# Patient Record
Sex: FEMALE | Race: WHITE | NOT HISPANIC OR LATINO | Employment: FULL TIME | URBAN - METROPOLITAN AREA
[De-identification: names, ages, dates, MRNs, and addresses within clinical notes are randomized per-mention and may not be internally consistent; named-entity substitution may affect disease eponyms.]

---

## 2017-11-29 ENCOUNTER — ALLSCRIPTS OFFICE VISIT (OUTPATIENT)
Dept: OTHER | Facility: OTHER | Age: 47
End: 2017-11-29

## 2017-11-29 DIAGNOSIS — N92.6 IRREGULAR MENSTRUATION: ICD-10-CM

## 2017-11-30 NOTE — PROGRESS NOTES
Assessment    1  Irregular bleeding (626 4) (N92 6)    Plan  Irregular bleeding    · * US PELVIS COMPLETE (TRANSABDOMINAL AND TRANSVAGINAL); Status:Active -Retrospective By Protocol Authorization; Requested for:2017;    Perform:[facility]; [phone]; Last Updated Castro Winters; 2017 11:02:07 AM;Ordered;bleeding; Ordered By:Areli Nina;    Discussion/Summary  Discussion Summary:   1  Recommend pelvic ultrasoundRecommend CBC with diff, ferritin and ironReturn to office after above is done for an endometrial biopsy  Chief Complaint  Chief Complaint Free Text Note Form: irregular bleeding      History of Present Illness  HPI: This is a 73-year-old female  ( x2, age 26 and 16) who is a long-term patient of Dr Roge Contreras, presents today complaining of irregular menses  She was seen last year and was informed that she was monica menopausal  Her cycles throughout the year have been irregular anywhere from every 1-2 months lasting 5-6 days up until  where she has now been bleeding continuously  She states last week she was passing large clots  She denies any hot flashes or night sweats  She has been stressed out  She is in the process of a divorce  She was evaluated by her family physician and had labs done including cholesterol and thyroid which was normal  She does have a copy of these results today  There is not a CBC included  She also states she has had difficulty losing weight despite aggressive dieting  She was diagnosed with anxiety and depression  Her family physician recommended medication  She does express concerns regarding any type of medications due to significant allergies  was on birth control pills approximately 14 years ago for contraception  Patient does not smoke  Review of Systems  Focused-Female:  Cardiovascular: no complaints of slow or fast heart rate, no chest pain, no palpitations, no leg claudication or lower extremity edema    Respiratory: no complaints of shortness of breath, no wheezing, no dyspnea on exertion, no orthopnea or PND  Gastrointestinal: as noted in HPI  Genitourinary: as noted in HPI  ROS Reviewed:   ROS reviewed  Active Problems  1  Abdominal bloating (787 3) (R14 0)   2  Allergic rhinitis (477 9) (J30 9)   3  Anxiety (300 00) (F41 9)   4  Dyspepsia (536 8) (R10 13)   5  Eczema (692 9) (L30 9)   6  Eczema of scalp (692 9) (L30 9)   7  Encounter for screening mammogram for breast cancer (V76 12) (Z12 31)   8  GERD (gastroesophageal reflux disease) (530 81) (K21 9)   9  Hematuria, microscopic (599 72) (R31 29)   10  Hyperglycemia (790 29) (R73 9)   11  Hyperlipidemia (272 4) (E78 5)   12  Irregular bleeding (626 4) (N92 6)   13  Irregular menses (626 4) (N92 6)   14  Mood swing (296 99) (F39)   15  Non-toxic uninodular goiter (241 0) (E04 1)   16  Obese   17  Perimenopausal (627 2) (N95 1)   18  Urinary tract infection (599 0) (N39 0)    Past Medical History  1  History of Fatigue (780 79) (R53 83)   2  History of Nausea (787 02) (R11 0)   3  History of Other abnormal finding of urine (791 9) (R82 99)  Active Problems And Past Medical History Reviewed: The active problems and past medical history were reviewed and updated today  Surgical History  1  History of Surgery Excision Of Lesion Of Gum  Surgical History Reviewed: The surgical history was reviewed and updated today  Family History  Mother    1  Denied: Family history of colitis   2  Denied: Family history of colonic polyps   3  Denied: Family history of Crohn's disease   4  Denied: Family history of liver disease   5  Family history of Thyroid Disorder (V18 19)  Father    6  Denied: Family history of colitis   7  Denied: Family history of colonic polyps   8  Denied: Family history of Crohn's disease   9  Denied: Family history of liver disease  Maternal Grandmother    8  Family history of Alcohol Abuse  Paternal Grandmother    6   Family history of Diabetes Mellitus (V18 0)  Maternal Grandfather    12  Family history of Arthritis (V17 7)   13  Family history of Hypertension (V17 49)  Family History    15  Family history of Coronary artery sclerosis   15  Family history of Diverticulitis   16  Family history of Diverticulosis   17  Family history of cerebrovascular accident (V17 1) (Z82 3)   25  Family history of gallbladder disease (V18 59) (Z83 79)   19  Family history of irritable bowel syndrome (V18 59) (Z83 79)   20  Family history of malignant neoplasm of prostate (V16 42) (Z80 42)   21  Family history of myocardial infarction (V17 3) (Z82 49)   22  Family history of pancreatic cancer (V16 0) (Z80 0)   23  Family history of pancreatitis (V18 59) (Z83 79)   24  Family history of Mitral valve prolapse   25  Family history of Pernicious anemia  Family History Reviewed: The family history was reviewed and updated today  Social History     · Former smoker (R54 93) (T89 998)   · Marital History - Currently    · Never Drank Alcohol  Social History Reviewed: The social history was reviewed and updated today  Current Meds   1  ALPRAZolam 0 25 MG Oral Tablet Disintegrating; PLACE 1 TABLET ON TONGUE AND ALLOW TO DISSOLVE TWICE DAILY AS NEEDED; Therapy: 50Adr5858 to (Evaluate:58Pxy3461); Last Rx:12Sep2016 Ordered   2  Azelastine HCl - 0 1 % Nasal Solution; USE 2 SPRAYS IN EACH       NOSTRIL TWICE DAILY; Therapy: 79Yab0246 to (Last Rx:22Apr2016)  Requested for: 95Olz0934 Ordered   3  Daily Multiple Vitamins Oral Tablet; Take 1 tablet daily Recorded   4  Mometasone Furoate 0 1 % External Ointment; APPLY SPARINGLY TO AFFECTED AREA(S) ONCE DAILY; Therapy: 95KNI7937 to (Last Rx:02Jun2016)  Requested for: 02Jun2016 Ordered   5  Mometasone Furoate 0 1 % External Solution; Apply to scalp at bedtime; Therapy: 78CJX2863 to (Last Rx:02Jun2016)  Requested for: 02Jun2016 Ordered   6  Omeprazole 20 MG Oral Capsule Delayed Release; TAKE 1 CAPSULE DAILY;  Therapy: 50UAK5523 to (Shabbir Stephens)  Requested for: 26WXK8497; Last AM:05XZC0030 Ordered   7  Omeprazole 40 MG Oral Capsule Delayed Release; TAKE 1 CAPSULE DAILY; Therapy: 35ITG2376 to (Janny Longoria)  Requested for: 59RUF7551; Last LY:18NTG5711 Ordered   8  Ondansetron 8 MG Oral Tablet Disintegrating; TAKE 1 TABLET EVERY 8 HOURS AS NEEDED FOR NAUSEA AND VOMITING; Therapy: 82Hgt0576 to (Last Rx:85Afu3436)  Requested for: 44Rxn2285 Ordered   9  Sulfamethoxazole-Trimethoprim 800-160 MG Oral Tablet; TAKE 1 TABLET EVERY 12 HOURS WITH FOOD; Therapy: 16SRE2482 to (Evaluate:58Zvq7072)  Requested for: 70Etc4284; Last Rx:43Kgk7965 Ordered    Allergies  1  Cephalosporins   2  Citalopram Hydrobromide SOLN   3  Ibuprofen TABS   4  Penicillins   5  Pseudoephedrine HCl TABS   6  Quinolones   7  Wellbutrin TABS   8  Zithromax TABS    Vitals  Vital Signs    Recorded: 32SYE4188 71:53OZ   Systolic 073   Diastolic 68   Height 5 ft 6 in   Weight 212 lb    BMI Calculated 34 22   BSA Calculated 2 05   LMP 19Oct2017       Physical Exam   Constitutional  General appearance: No acute distress, well appearing and well nourished  Pulmonary  Auscultation of lungs: Clear to auscultation  Cardiovascular  Auscultation of heart: Normal rate and rhythm, normal S1 and S2, no murmurs  Genitourinary  External genitalia: Normal and no lesions appreciated  Vagina: Normal, no lesions or dryness appreciated  Urethral meatus: Normal    Cervix: Normal, no palpable masses  -- Patient is menstruating today  Uterus: Normal, non-tender, not enlarged, and no palpable masses  Adnexa/parametria: Normal, non-tender and no fullness or masses appreciated  Abdomen  Abdomen: Normal, non-tender, and no organomegaly noted         Signatures   Electronically signed by : Mireya Maki DO; Nov 29 2017 11:05AM EST                       (Author)

## 2017-12-05 ENCOUNTER — HOSPITAL ENCOUNTER (OUTPATIENT)
Dept: ULTRASOUND IMAGING | Facility: HOSPITAL | Age: 47
Discharge: HOME | End: 2017-12-05
Attending: OBSTETRICS & GYNECOLOGY
Payer: COMMERCIAL

## 2017-12-05 ENCOUNTER — APPOINTMENT (OUTPATIENT)
Dept: LAB | Facility: HOSPITAL | Age: 47
End: 2017-12-05
Attending: OBSTETRICS & GYNECOLOGY
Payer: COMMERCIAL

## 2017-12-05 DIAGNOSIS — N92.6 IRREGULAR MENSTRUATION: ICD-10-CM

## 2017-12-05 LAB
BASOPHILS # BLD AUTO: 0.01 THOUSANDS/ΜL (ref 0–0.1)
BASOPHILS NFR BLD AUTO: 0 % (ref 0–1)
EOSINOPHIL # BLD AUTO: 0.38 THOUSAND/ΜL (ref 0–0.61)
EOSINOPHIL NFR BLD AUTO: 5 % (ref 0–6)
ERYTHROCYTE [DISTWIDTH] IN BLOOD BY AUTOMATED COUNT: 12.6 % (ref 11.6–15.1)
FERRITIN SERPL-MCNC: 10 NG/ML (ref 8–388)
HCT VFR BLD AUTO: 32.9 % (ref 34.8–46.1)
HGB BLD-MCNC: 11 G/DL (ref 11.5–15.4)
IRON SERPL-MCNC: 37 UG/DL (ref 50–170)
LYMPHOCYTES # BLD AUTO: 1.98 THOUSANDS/ΜL (ref 0.6–4.47)
LYMPHOCYTES NFR BLD AUTO: 28 % (ref 14–44)
MCH RBC QN AUTO: 29.1 PG (ref 26.8–34.3)
MCHC RBC AUTO-ENTMCNC: 33.4 G/DL (ref 31.4–37.4)
MCV RBC AUTO: 87 FL (ref 82–98)
MONOCYTES # BLD AUTO: 0.51 THOUSAND/ΜL (ref 0.17–1.22)
MONOCYTES NFR BLD AUTO: 7 % (ref 4–12)
NEUTROPHILS # BLD AUTO: 4.32 THOUSANDS/ΜL (ref 1.85–7.62)
NEUTS SEG NFR BLD AUTO: 60 % (ref 43–75)
NRBC BLD AUTO-RTO: 0 /100 WBCS
PLATELET # BLD AUTO: 315 THOUSANDS/UL (ref 149–390)
PMV BLD AUTO: 10.1 FL (ref 8.9–12.7)
RBC # BLD AUTO: 3.78 MILLION/UL (ref 3.81–5.12)
WBC # BLD AUTO: 7.2 THOUSAND/UL (ref 4.31–10.16)

## 2017-12-05 PROCEDURE — 36415 COLL VENOUS BLD VENIPUNCTURE: CPT

## 2017-12-05 PROCEDURE — 85025 COMPLETE CBC W/AUTO DIFF WBC: CPT

## 2017-12-05 PROCEDURE — 76830 TRANSVAGINAL US NON-OB: CPT

## 2017-12-05 PROCEDURE — 83540 ASSAY OF IRON: CPT

## 2017-12-05 PROCEDURE — 82728 ASSAY OF FERRITIN: CPT

## 2017-12-05 PROCEDURE — 76856 US EXAM PELVIC COMPLETE: CPT

## 2017-12-12 ENCOUNTER — GENERIC CONVERSION - ENCOUNTER (OUTPATIENT)
Dept: OTHER | Facility: OTHER | Age: 47
End: 2017-12-12

## 2017-12-12 PROCEDURE — 88305 TISSUE EXAM BY PATHOLOGIST: CPT | Performed by: OBSTETRICS & GYNECOLOGY

## 2017-12-13 ENCOUNTER — LAB REQUISITION (OUTPATIENT)
Dept: LAB | Facility: HOSPITAL | Age: 47
End: 2017-12-13
Payer: COMMERCIAL

## 2017-12-13 DIAGNOSIS — N92.6 IRREGULAR MENSTRUATION: ICD-10-CM

## 2017-12-19 ENCOUNTER — GENERIC CONVERSION - ENCOUNTER (OUTPATIENT)
Dept: OTHER | Facility: OTHER | Age: 47
End: 2017-12-19

## 2018-01-10 NOTE — RESULT NOTES
Verified Results  (1) CELIAC DISEASE AB PROFILE 06Jun2016 08:39AM Gifty Hartmann Order Number: [id number]     Test Name Result Flag Reference   tTG IGG <2 U/mL  0 - 5   Negative        0 - 5                              Weak Positive   6 - 9                              Positive           >9   tTG IGA <2 U/mL  0 - 3   Negative        0 -  3                              Weak Positive   4 - 10                              Positive           >10 Tissue Transglutaminase (tTG) has been identified as the endomysial antigen  Studies have demonstr- ated that endomysial IgA antibodies have over 99% specificity for gluten sensitive enteropathy     GLIADA 3 units  0 - 19   Negative                   0 - 19                   Weak Positive             20 - 30                   Moderate to Strong Positive   >30   GLIADG 1 units  0 - 19   Negative                   0 - 19                   Weak Positive             20 - 30                   Moderate to Strong Positive   >30   ENDOMYSIAL AB IGA Negative  Negative   Performed at:  [facility] [place] [address]  [id number]  : Familia Esparza MD, Phone:  [phone]    mg/dL  56 - 311

## 2018-01-11 NOTE — RESULT NOTES
Verified Results  (1) COMPREHENSIVE METABOLIC PANEL 49BDD0184 65:49UT Adin Hartmann     Test Name Result Flag Reference   GLUCOSE,RANDM 94 mg/dL     If the patient is fasting, the ADA then defines impaired fasting glucose as > 100 mg/dL and diabetes as > or equal to 123 mg/dL  SODIUM 137 mmol/L  136-145   POTASSIUM 4 5 mmol/L  3 5-5 3   CHLORIDE 106 mmol/L  100-108   CARBON DIOXIDE 28 mmol/L  21-32   ANION GAP (CALC) 3 mmol/L L 4-13   BLOOD UREA NITROGEN 18 mg/dL  5-25   CREATININE 0 77 mg/dL  0 60-1 30   Standardized to IDMS reference method   CALCIUM 9 0 mg/dL  8 3-10 1   BILI, TOTAL 0 34 mg/dL  0 20-1 00   ALK PHOSPHATAS 91 U/L     ALT (SGPT) 19 U/L  12-78   AST(SGOT) 11 U/L  5-45   ALBUMIN 3 9 g/dL  3 5-5 0   TOTAL PROTEIN 7 1 g/dL  6 4-8 2   eGFR Non-African American      >60 0 ml/min/1 [facility] [employer] Disease Education Program recommendations are as follows:  GFR calculation is accurate only with a steady state creatinine  Chronic Kidney disease less than 60 ml/min/1 73 sq  meters  Kidney failure less than 15 ml/min/1 73 sq  meters  (1) HEMOGLOBIN A1C 04KIP0989 08:39AM Adin Hartmann     Test Name Result Flag Reference   HEMOGLOBIN A1C 5 2 %  4 2-6 3   EST  AVG  GLUCOSE 103 mg/dl       (1) LIPID PANEL, FASTING 47WSI6856 08:39AM Emilio Hartmann     Test Name Result Flag Reference   CHOLESTEROL 210 mg/dL H    HDL,DIRECT 46 mg/dL  40-60   Specimen collection should occur prior to Metamizole administration due to the potential for falsely depressed results     LDL CHOLESTEROL CALCULATED 139 mg/dL H 0-100   Triglyceride:         Normal              <150 mg/dl       Borderline High    150-199 mg/dl       High               200-499 mg/dl       Very High          >499 mg/dl  Cholesterol:         Desirable        <200 mg/dl      Borderline High  200-239 mg/dl      High             >239 mg/dl  HDL Cholesterol:        High    >59 mg/dL      Low     <41 mg/dL  LDL

## 2018-01-11 NOTE — RESULT NOTES
Verified Results  * NM HEPATOBILIARY 96DRG2378 12:53PM Emilio Hartmann     Test Name Result Flag Reference   NM HEPATOBILIARY (Report)     HEPATOBILIARY SCAN WITH CHOLECYSTOKININ ADMINISTRATION      INDICATION: Right upper quadrant pain, nausea for one year     COMPARISON: None available     TECHNIQUE:  Following the intravenous administration of 5 3 mCi Tc-99m labeled mebrofenin, dynamic abdominal images were obtained over a 60 minute time period  Images were performed in AP projection  FINDINGS:      There is prompt, uniform accumulation with normal clearance of the radiopharmaceutical by the liver  There is normal filling of the intrahepatic ducts, common bile duct and gallbladder with normal excretion of the radiopharmaceutical into the duodenum  In order to evaluate the contractile response of the gallbladder to cholecystokinin stimulation, 1 7 mcg sincalide (0 02 mcg/kg) was administered by slow intravenous infusion over 60 minutes  These images demonstrate normal contraction of the    gallbladder  The calculated gallbladder ejection fraction is 65 % (N = >35%)  The patient experienced no symptoms after CCK administration  IMPRESSION:     1  Normal hepatobiliary study   2   Normal contractile response of the gallbladder to cholecystokinin infusion  (65%)      Signed by:   Larry Up MD   1/12/16

## 2018-01-14 VITALS
SYSTOLIC BLOOD PRESSURE: 110 MMHG | HEIGHT: 66 IN | WEIGHT: 212 LBS | DIASTOLIC BLOOD PRESSURE: 68 MMHG | BODY MASS INDEX: 34.07 KG/M2

## 2018-01-15 NOTE — RESULT NOTES
Verified Results  * US ABDOMEN COMPLETE 12Jan2016 10:20AM Emilio Hartmann     Test Name Result Flag Reference   US ABDOMEN COMPLETE (Report)     ABDOMEN ULTRASOUND, COMPLETE     INDICATION: Epigastric pain  Nausea  COMPARISON: 4/21/2015     TECHNIQUE:  Real-time ultrasound of the abdomen was performed with a curvilinear transducer with both volumetric sweeps and still imaging techniques  FINDINGS:     PANCREAS: Visualized portions of the pancreas are within normal limits  AORTA AND IVC: Visualized portions are normal for patient age  LIVER:   Size: Within normal range  The liver measures 16 6 cm in the midclavicular line  Contour: Surface contour is smooth  Parenchyma: Echogenicity and echotexture are within normal limits  No evidence of suspicious mass  The main portal vein is patent and hepatopetal       BILIARY:   The gallbladder is normal in caliber  No wall thickening or pericholecystic fluid  No stones or sludge identified  Sonographic Roland Saver sign is negative  No intrahepatic biliary dilatation  CBD measures 3 mm  No choledocholithiasis  KIDNEY:    Right kidney measures 11 7 cm  Within normal limits  Left kidney measures 10 1 cm  Within normal limits  SPLEEN:    Measures 11 4 cm  Within normal limits  ASCITES: None         Normal

## 2018-01-16 NOTE — RESULT NOTES
Verified Results  * US ABDOMEN COMPLETE 12Jan2016 10:20AM Emilio Hartmann     Test Name Result Flag Reference   US ABDOMEN COMPLETE (Report)     ABDOMEN ULTRASOUND, COMPLETE     INDICATION: Epigastric pain  Nausea  COMPARISON: 4/21/2015     TECHNIQUE:  Real-time ultrasound of the abdomen was performed with a curvilinear transducer with both volumetric sweeps and still imaging techniques  FINDINGS:     PANCREAS: Visualized portions of the pancreas are within normal limits  AORTA AND IVC: Visualized portions are normal for patient age  LIVER:   Size: Within normal range  The liver measures 16 6 cm in the midclavicular line  Contour: Surface contour is smooth  Parenchyma: Echogenicity and echotexture are within normal limits  No evidence of suspicious mass  The main portal vein is patent and hepatopetal       BILIARY:   The gallbladder is normal in caliber  No wall thickening or pericholecystic fluid  No stones or sludge identified  Sonographic Jeni Brittney sign is negative  No intrahepatic biliary dilatation  CBD measures 3 mm  No choledocholithiasis  KIDNEY:    Right kidney measures 11 7 cm  Within normal limits  Left kidney measures 10 1 cm  Within normal limits  SPLEEN:    Measures 11 4 cm  Within normal limits  ASCITES: None         Normal

## 2018-01-16 NOTE — RESULT NOTES
Verified Results  (1) ALLERGY, FOOD PANEL 06Jun2016 08:39AM Norm Sai Rojo Order Number: [id number]     Test Name Result Flag Reference   FISH COD <0 10 kUA/I  0 00-0 09   EGG WHITE <0 10 kUA/I  0 00-0 09   GLUTEN <0 10 kUA/I  0 00-0 09   MILK <0 10 kUA/I  0 00-0 09   PEANUT <0 10 kUA/I  0 00-0 09   F041 SALMON <0 10 kUA/I  0 00-0 09   SCALLOP <0 10 kUA/I  0 00-0 09   SESAME <0 10 kUA/I  0 00-0 09   SHRIMP 0 68 kUA/I H 0 00-0 09   SOYBEAN <0 10 kUA/I  0 00-0 09   ALLERGEN TUNA (F40) IGE <0 10 kUA/I  0 00-0 09   WALNUT <0 10 kUA/I  0 00-0 09   WHEAT <0 10 kUA/I  0 00-0 09   TOTAL  kU/l H 0-113   ALLERGEN ALMONDS <0 10 kUA/I  0 00-0 09   ALLERGEN CASHEW <0 10 kUA/I  0 00-0 09   ALLERGEN HAZELNUT/FILBERT IGE <0 10 kUA/l  0 00-0 09   ALLERGEN COMMENT See Below     As in all diagnostic testing, a diagnosis should be made by the physician based on both test results and patient clinical history

## 2018-01-18 NOTE — RESULT NOTES
Verified Results  * NM HEPATOBILIARY [id number] 12:53PM Emilio Hartmann     Test Name Result Flag Reference   NM HEPATOBILIARY (Report)     HEPATOBILIARY SCAN WITH CHOLECYSTOKININ ADMINISTRATION      INDICATION: Right upper quadrant pain, nausea for one year     COMPARISON: None available     TECHNIQUE:  Following the intravenous administration of 5 3 mCi Tc-99m labeled mebrofenin, dynamic abdominal images were obtained over a 60 minute time period  Images were performed in AP projection  FINDINGS:      There is prompt, uniform accumulation with normal clearance of the radiopharmaceutical by the liver  There is normal filling of the intrahepatic ducts, common bile duct and gallbladder with normal excretion of the radiopharmaceutical into the duodenum  In order to evaluate the contractile response of the gallbladder to cholecystokinin stimulation, 1 7 mcg sincalide (0 02 mcg/kg) was administered by slow intravenous infusion over 60 minutes  These images demonstrate normal contraction of the    gallbladder  The calculated gallbladder ejection fraction is 65 % (N = >35%)  The patient experienced no symptoms after CCK administration  1  Normal hepatobiliary study   2   Normal contractile response of the gallbladder to cholecystokinin infusion  (65%)

## 2018-01-23 NOTE — RESULT NOTES
Verified Results  (1) TISSUE EXAM [id number] 12:05PM Lelo Sauceda     Test Name Result Flag Reference   LAB AP CASE REPORT (Report)     Surgical Pathology Report             Case: [id number]                   Authorizing Provider: Vivek Forbes DO    Collected:      12/12/2017           Pathologist:      Honey Machado MD   Received:      12/14/2017 3117        Specimen:  Endometrium   LAB AP FINAL DIAGNOSIS      A  Endometrium, biopsy:    - Proliferative endometrium, no hyperplasia, atypia and malignancy  Electronically signed by Honey Machado MD on 12/18/2017 at 12:05 PM   LAB AP SURGICAL ADDITIONAL INFORMATION (Report)     All controls performed with the immunohistochemical stains reported above   reacted appropriately  These tests were developed and their performance   characteristics determined by [facility] or   [facility]  They may not be cleared or approved by the [place]  Food and Drug Administration  The FDA has determined that such clearance   or approval is not necessary  These tests are used for clinical purposes  They should not be regarded as investigational or for research  This   laboratory has been approved by [facility], designated as a high-complexity   laboratory and is qualified to perform these tests  LAB AP GROSS DESCRIPTION (Report)     A  The specimen is received in formalin, labeled with the patient's name   and hospital number, and is designated endometrium   The specimen   consists of Soft dark brown and mucoid tissuefragments measuring 2 2 x   1 2 x 0 2 cm and greatest dimension  Entirely submitted  One cassette  Note: The estimated total formalin fixation time based upon information   provided by the submitting clinician and the standard processing schedule   is less than 72 hours    Perla Acuna

## 2018-01-24 VITALS
BODY MASS INDEX: 33.43 KG/M2 | SYSTOLIC BLOOD PRESSURE: 112 MMHG | HEIGHT: 66 IN | WEIGHT: 208 LBS | DIASTOLIC BLOOD PRESSURE: 62 MMHG

## 2018-02-02 ENCOUNTER — TELEPHONE (OUTPATIENT)
Dept: OBGYN CLINIC | Facility: CLINIC | Age: 48
End: 2018-02-02

## 2018-02-02 NOTE — TELEPHONE ENCOUNTER
Pt is calling to start birth control pills to help regulate her cycle  States this was discussed at her previous appt    She did not have a period since December

## 2018-02-05 ENCOUNTER — TELEPHONE (OUTPATIENT)
Dept: OBGYN CLINIC | Facility: CLINIC | Age: 48
End: 2018-02-05

## 2018-02-12 ENCOUNTER — TELEPHONE (OUTPATIENT)
Dept: OBGYN CLINIC | Facility: CLINIC | Age: 48
End: 2018-02-12

## 2018-02-12 DIAGNOSIS — N92.6 IRREGULAR MENSES: Primary | ICD-10-CM

## 2018-02-12 DIAGNOSIS — N92.6 IRREGULAR MENSTRUAL CYCLE: Primary | ICD-10-CM

## 2018-02-12 RX ORDER — NORETHINDRONE ACETATE AND ETHINYL ESTRADIOL 1MG-20(21)
1 KIT ORAL DAILY
Qty: 28 TABLET | Refills: 3 | Status: SHIPPED | OUTPATIENT
Start: 2018-02-12 | End: 2018-04-16 | Stop reason: SDUPTHER

## 2018-02-12 RX ORDER — NORETHINDRONE ACETATE AND ETHINYL ESTRADIOL 1MG-20(21)
1 KIT ORAL DAILY
Qty: 28 TABLET | Refills: 0 | Status: CANCELLED | OUTPATIENT
Start: 2018-02-12 | End: 2018-03-12

## 2018-02-12 NOTE — TELEPHONE ENCOUNTER
Pt informed to start Loestrin 1/20 - LMP 10/2017 & continued with persistent bleeding until 12/12/18 - starting ocps to regulate menses - states has not been sexually active x past 7 months  Will do HPT before beginning ocps & recall in 3-4 months to update    KA to escribe presc Loestrin 1/20

## 2018-04-16 ENCOUNTER — ANNUAL EXAM (OUTPATIENT)
Dept: OBGYN CLINIC | Facility: CLINIC | Age: 48
End: 2018-04-16
Payer: COMMERCIAL

## 2018-04-16 VITALS
HEIGHT: 67 IN | SYSTOLIC BLOOD PRESSURE: 118 MMHG | WEIGHT: 211 LBS | DIASTOLIC BLOOD PRESSURE: 76 MMHG | BODY MASS INDEX: 33.12 KG/M2

## 2018-04-16 DIAGNOSIS — Z12.39 BREAST CANCER SCREENING: ICD-10-CM

## 2018-04-16 DIAGNOSIS — N92.6 IRREGULAR MENSES: ICD-10-CM

## 2018-04-16 DIAGNOSIS — N83.202 CYST OF LEFT OVARY: ICD-10-CM

## 2018-04-16 DIAGNOSIS — Z01.419 ENCOUNTER FOR ANNUAL ROUTINE GYNECOLOGICAL EXAMINATION: Primary | ICD-10-CM

## 2018-04-16 DIAGNOSIS — N92.0 MENORRHAGIA WITH REGULAR CYCLE: ICD-10-CM

## 2018-04-16 PROCEDURE — 99396 PREV VISIT EST AGE 40-64: CPT | Performed by: OBSTETRICS & GYNECOLOGY

## 2018-04-16 PROCEDURE — 87624 HPV HI-RISK TYP POOLED RSLT: CPT | Performed by: OBSTETRICS & GYNECOLOGY

## 2018-04-16 PROCEDURE — G0145 SCR C/V CYTO,THINLAYER,RESCR: HCPCS | Performed by: OBSTETRICS & GYNECOLOGY

## 2018-04-16 RX ORDER — ONDANSETRON 8 MG/1
TABLET, ORALLY DISINTEGRATING ORAL
COMMUNITY
Start: 2013-09-03 | End: 2021-10-21 | Stop reason: HOSPADM

## 2018-04-16 RX ORDER — NORETHINDRONE ACETATE AND ETHINYL ESTRADIOL 1MG-20(21)
1 KIT ORAL DAILY
Qty: 28 TABLET | Refills: 5 | Status: SHIPPED | OUTPATIENT
Start: 2018-04-16 | End: 2018-09-25 | Stop reason: SDUPTHER

## 2018-04-16 RX ORDER — OMEPRAZOLE 20 MG/1
1 CAPSULE, DELAYED RELEASE ORAL DAILY
COMMUNITY
Start: 2016-01-07

## 2018-04-16 RX ORDER — ESCITALOPRAM OXALATE 10 MG/1
10 TABLET ORAL
COMMUNITY
Start: 2017-11-08 | End: 2020-06-24 | Stop reason: SDUPTHER

## 2018-04-16 RX ORDER — MOMETASONE FUROATE 1 MG/G
OINTMENT TOPICAL DAILY
COMMUNITY
Start: 2016-06-02 | End: 2021-10-21 | Stop reason: HOSPADM

## 2018-04-16 NOTE — PROGRESS NOTES
Assessment/Plan:    Pap smear done as well as annual   Encouraged self-breast examination as well as calcium supplementation  Recommend annual mammogram   Recommend pelvic ultrasound follow-up left ovarian cyst   Patient will continue Loestrin 1/20 x 2-3 months and call with a menstrual diary update  I will notify her the above results via telephone  Provided the above is normal and her cycles improved she will return to office in 1 year  She will continue to follow-up with her therapist as well as restart her Lexapro through her primary care physician  No problem-specific Assessment & Plan notes found for this encounter  Diagnoses and all orders for this visit:    Encounter for annual routine gynecological examination    Menorrhagia with regular cycle    Breast cancer screening  -     Mammo screening bilateral w cad; Future    Cyst of left ovary  -     US pelvis complete w transvaginal; Future    Irregular menses  -     norethindrone-ethinyl estradiol (JUNEL FE 1/20) 1-20 MG-MCG per tablet; Take 1 tablet by mouth daily    Other orders  -     DAILY MULTIPLE VITAMINS PO; Take 1 tablet by mouth daily  -     escitalopram (LEXAPRO) 10 mg tablet; Take 10 mg by mouth  -     mometasone (ELOCON) 0 1 % ointment; Apply topically daily  -     omeprazole (PriLOSEC) 20 mg delayed release capsule; Take 1 capsule by mouth daily  -     ondansetron (ZOFRAN-ODT) 8 mg disintegrating tablet; Take by mouth          Subjective:      Patient ID: Shital Cain is a 53 y o  female  HPI     This is a 81-year-old female  ( x2, age 26 and 16) presents for her annual gyn exam   She was seen approximately 5 months ago complaining of irregular menses  She underwent a pelvic ultrasound and an endometrial biopsy which had revealed benign pathology  She did have a small ovarian cyst with recommendations on follow-up pelvic ultrasound  This is not been done  She then started low-dose OCPs in February    She is in her second package  She had her menstrual cycle 2/11-19,3/31-4/6  She denies any nausea or vomiting  She has been under a lot of stress  She is in the process of filing divorce  Her  are still living in the same house together  They do also share a business  He is not agreeable to a divorce which is making this more challenging for her  Patient has been on Lexapro in the past and is planning on restarting due to increase in anxiety and depression  She has been followed through her primary care physician  Patient denies any changes in bowel or bladder function  The following portions of the patient's history were reviewed and updated as appropriate: allergies, current medications, past family history, past medical history, past social history, past surgical history and problem list     Review of Systems   Constitutional: Negative for fatigue, fever and unexpected weight change  Respiratory: Negative for cough, chest tightness, shortness of breath and wheezing  Cardiovascular: Negative  Negative for chest pain and palpitations  Gastrointestinal: Negative  Negative for abdominal distention, abdominal pain, blood in stool, constipation, diarrhea, nausea and vomiting  Genitourinary: Negative  Negative for difficulty urinating, dyspareunia, dysuria, flank pain, frequency, genital sores, hematuria, pelvic pain, urgency, vaginal bleeding, vaginal discharge and vaginal pain  Skin: Negative for rash  Objective:      /76   Ht 5' 7" (1 702 m)   Wt 95 7 kg (211 lb)   LMP 03/31/2018 (Exact Date)   Breastfeeding? No   BMI 33 05 kg/m²          Physical Exam   Constitutional: She appears well-developed and well-nourished  Cardiovascular: Normal rate and regular rhythm  Pulmonary/Chest: Effort normal and breath sounds normal  Right breast exhibits no inverted nipple, no mass, no nipple discharge, no skin change and no tenderness   Left breast exhibits no inverted nipple, no mass, no nipple discharge, no skin change and no tenderness  Abdominal: Soft  Bowel sounds are normal  She exhibits no distension  There is no tenderness  There is no rebound and no guarding  Genitourinary: Rectum normal, vagina normal and uterus normal  There is no lesion on the right labia  There is no lesion on the left labia  Cervix exhibits no discharge and no friability  Right adnexum displays no mass, no tenderness and no fullness  Left adnexum displays no mass, no tenderness and no fullness  No vaginal discharge found

## 2018-04-18 LAB
HPV RRNA GENITAL QL NAA+PROBE: NORMAL
LAB AP GYN PRIMARY INTERPRETATION: NORMAL
LAB AP LMP: NORMAL
Lab: NORMAL

## 2018-09-25 DIAGNOSIS — N92.6 IRREGULAR MENSES: ICD-10-CM

## 2018-09-25 RX ORDER — NORETHINDRONE ACETATE AND ETHINYL ESTRADIOL 1MG-20(21)
1 KIT ORAL DAILY
Qty: 84 TABLET | Refills: 2 | Status: SHIPPED | OUTPATIENT
Start: 2018-09-25 | End: 2019-05-30 | Stop reason: SDUPTHER

## 2019-03-13 ENCOUNTER — HOSPITAL ENCOUNTER (OUTPATIENT)
Dept: ULTRASOUND IMAGING | Facility: HOSPITAL | Age: 49
Discharge: HOME | End: 2019-03-13
Attending: OBSTETRICS & GYNECOLOGY
Payer: COMMERCIAL

## 2019-03-13 ENCOUNTER — HOSPITAL ENCOUNTER (OUTPATIENT)
Dept: MAMMOGRAPHY | Facility: HOSPITAL | Age: 49
Discharge: HOME | End: 2019-03-13
Attending: OBSTETRICS & GYNECOLOGY
Payer: COMMERCIAL

## 2019-03-13 VITALS — BODY MASS INDEX: 33.12 KG/M2 | WEIGHT: 211 LBS | HEIGHT: 67 IN

## 2019-03-13 DIAGNOSIS — N83.202 CYST OF LEFT OVARY: ICD-10-CM

## 2019-03-13 DIAGNOSIS — Z12.39 BREAST CANCER SCREENING: ICD-10-CM

## 2019-03-13 PROCEDURE — 76830 TRANSVAGINAL US NON-OB: CPT

## 2019-03-13 PROCEDURE — 76856 US EXAM PELVIC COMPLETE: CPT

## 2019-03-13 PROCEDURE — 77067 SCR MAMMO BI INCL CAD: CPT

## 2019-03-18 ENCOUNTER — TELEPHONE (OUTPATIENT)
Dept: OBGYN CLINIC | Facility: CLINIC | Age: 49
End: 2019-03-18

## 2019-05-30 ENCOUNTER — ANNUAL EXAM (OUTPATIENT)
Dept: OBGYN CLINIC | Facility: CLINIC | Age: 49
End: 2019-05-30
Payer: COMMERCIAL

## 2019-05-30 VITALS
BODY MASS INDEX: 33.43 KG/M2 | SYSTOLIC BLOOD PRESSURE: 120 MMHG | WEIGHT: 208 LBS | HEIGHT: 66 IN | DIASTOLIC BLOOD PRESSURE: 78 MMHG

## 2019-05-30 DIAGNOSIS — Z01.419 ENCOUNTER FOR ANNUAL ROUTINE GYNECOLOGICAL EXAMINATION: Primary | ICD-10-CM

## 2019-05-30 DIAGNOSIS — N92.6 IRREGULAR MENSES: ICD-10-CM

## 2019-05-30 DIAGNOSIS — Z12.39 BREAST CANCER SCREENING: ICD-10-CM

## 2019-05-30 DIAGNOSIS — Z11.3 SCREENING EXAMINATION FOR VENEREAL DISEASE: ICD-10-CM

## 2019-05-30 PROCEDURE — 87591 N.GONORRHOEAE DNA AMP PROB: CPT | Performed by: OBSTETRICS & GYNECOLOGY

## 2019-05-30 PROCEDURE — 87491 CHLMYD TRACH DNA AMP PROBE: CPT | Performed by: OBSTETRICS & GYNECOLOGY

## 2019-05-30 PROCEDURE — 99396 PREV VISIT EST AGE 40-64: CPT | Performed by: OBSTETRICS & GYNECOLOGY

## 2019-05-30 RX ORDER — FLUTICASONE PROPIONATE 50 MCG
1 SPRAY, SUSPENSION (ML) NASAL DAILY
COMMUNITY
End: 2021-10-21 | Stop reason: HOSPADM

## 2019-05-30 RX ORDER — NORETHINDRONE ACETATE AND ETHINYL ESTRADIOL 1MG-20(21)
1 KIT ORAL DAILY
Qty: 84 TABLET | Refills: 3 | Status: SHIPPED | OUTPATIENT
Start: 2019-05-30 | End: 2020-03-23 | Stop reason: SDUPTHER

## 2019-05-30 RX ORDER — MOMETASONE FUROATE 1 MG/G
OINTMENT TOPICAL DAILY
COMMUNITY
Start: 2018-09-26

## 2019-05-31 LAB
C TRACH DNA SPEC QL NAA+PROBE: NEGATIVE
N GONORRHOEA DNA SPEC QL NAA+PROBE: NEGATIVE

## 2019-11-05 ENCOUNTER — OFFICE VISIT (OUTPATIENT)
Dept: OBGYN CLINIC | Facility: CLINIC | Age: 49
End: 2019-11-05
Payer: COMMERCIAL

## 2019-11-05 VITALS
BODY MASS INDEX: 34.07 KG/M2 | HEIGHT: 66 IN | DIASTOLIC BLOOD PRESSURE: 78 MMHG | WEIGHT: 212 LBS | SYSTOLIC BLOOD PRESSURE: 122 MMHG

## 2019-11-05 DIAGNOSIS — N92.6 IRREGULAR MENSES: Primary | ICD-10-CM

## 2019-11-05 PROCEDURE — 99213 OFFICE O/P EST LOW 20 MIN: CPT | Performed by: OBSTETRICS & GYNECOLOGY

## 2019-11-05 RX ORDER — SCOLOPAMINE TRANSDERMAL SYSTEM 1 MG/1
1 PATCH, EXTENDED RELEASE TRANSDERMAL
COMMUNITY
Start: 2019-07-08 | End: 2021-10-21

## 2019-11-05 NOTE — PROGRESS NOTES
Assessment/Plan:  Reviewed monica menopausal symptoms  At this point she will continue her low-dose birth control pill  We will check [facility] level/estradiol day 26 of her pill pack  She will follow up with her family physician regarding concerns of thyroid nodule  Resume annual gyn exam   I will notify her the above results via telephone  No problem-specific Assessment & Plan notes found for this encounter  Diagnoses and all orders for this visit:    Irregular menses  -     Follicle stimulating hormone  -     Estradiol; Future    Other orders  -     scopolamine (TRANSDERM-SCOP) 1 5 mg/3 days TD 72 hr patch; Place 1 patch on the skin every third day  -     Ferrous Gluconate-C-Folic Acid (IRON-C PO); Take by mouth          Subjective:      Patient ID: Kapil Bartholomew is a 51 y o  female  HPI     This is a 56-year-old female  ( x2, age 22, 14) presents for discussion  She was evaluated by her dermatologist several weeks prior complaining of acne and hair thinning  Her physician had question whether she had polycystic ovaries  Patient has also had difficulty in losing weight despite exercising and dieting  Patient is on a low-dose birth control pill for the last 2 and half years  Prior to that she was having some irregular cycles  Her pelvic ultrasound had revealed normal ovaries no evidence of ovarian cyst   Her last menstrual cycle was 2019  She does get some mild hot flashes, tolerable  The following portions of the patient's history were reviewed and updated as appropriate: allergies, current medications, past family history, past medical history, past social history, past surgical history and problem list     Review of Systems   Constitutional: Negative for fatigue, fever and unexpected weight change  Respiratory: Negative for cough, chest tightness, shortness of breath and wheezing  Cardiovascular: Negative  Negative for chest pain and palpitations  Gastrointestinal: Negative  Negative for abdominal distention, abdominal pain, blood in stool, constipation, diarrhea, nausea and vomiting  Genitourinary: Negative  Negative for difficulty urinating, dyspareunia, dysuria, flank pain, frequency, genital sores, hematuria, pelvic pain, urgency, vaginal bleeding, vaginal discharge and vaginal pain  Skin: Negative for rash  Objective:      /78   Ht 5' 6" (1 676 m)   Wt 96 2 kg (212 lb)   LMP 07/26/2019   Breastfeeding? No   BMI 34 22 kg/m²          Physical Exam   Constitutional: She is oriented to person, place, and time  She appears well-developed and well-nourished  Cardiovascular: Normal rate and regular rhythm  Pulmonary/Chest: Effort normal and breath sounds normal    Neurological: She is alert and oriented to person, place, and time  Skin: Skin is warm and dry

## 2019-11-26 ENCOUNTER — TELEPHONE (OUTPATIENT)
Dept: OBGYN CLINIC | Facility: CLINIC | Age: 49
End: 2019-11-26

## 2019-11-26 NOTE — TELEPHONE ENCOUNTER
----- Message from Brendon Diez DO sent at 11/25/2019  9:23 PM EST -----  Inform pt labs c/w menopause, cont OCP and will recheck labs 6-8 months

## 2019-11-26 NOTE — TELEPHONE ENCOUNTER
Pt informed of lab results & recom to have repeat [address] & estradiol in 6-8 months  Will give lab order @ yearly appt due 5/2020  Pt's LMP 7/26/19  Reviewed dietary & exercise, calcium/vit D supp

## 2020-03-23 DIAGNOSIS — N92.6 IRREGULAR MENSES: ICD-10-CM

## 2020-03-23 RX ORDER — NORETHINDRONE ACETATE AND ETHINYL ESTRADIOL 1MG-20(21)
1 KIT ORAL DAILY
Qty: 84 TABLET | Refills: 0 | Status: SHIPPED | OUTPATIENT
Start: 2020-03-23 | End: 2021-10-21 | Stop reason: HOSPADM

## 2020-06-24 ENCOUNTER — ANNUAL EXAM (OUTPATIENT)
Dept: OBGYN CLINIC | Facility: CLINIC | Age: 50
End: 2020-06-24
Payer: COMMERCIAL

## 2020-06-24 VITALS
BODY MASS INDEX: 34.55 KG/M2 | SYSTOLIC BLOOD PRESSURE: 122 MMHG | HEIGHT: 66 IN | DIASTOLIC BLOOD PRESSURE: 84 MMHG | WEIGHT: 215 LBS | TEMPERATURE: 99.7 F

## 2020-06-24 DIAGNOSIS — Z01.419 ENCOUNTER FOR ANNUAL ROUTINE GYNECOLOGICAL EXAMINATION: Primary | ICD-10-CM

## 2020-06-24 DIAGNOSIS — F32.A DEPRESSION, UNSPECIFIED DEPRESSION TYPE: ICD-10-CM

## 2020-06-24 DIAGNOSIS — Z12.39 BREAST CANCER SCREENING: ICD-10-CM

## 2020-06-24 DIAGNOSIS — N95.1 PERIMENOPAUSE: ICD-10-CM

## 2020-06-24 PROCEDURE — 99396 PREV VISIT EST AGE 40-64: CPT | Performed by: OBSTETRICS & GYNECOLOGY

## 2020-06-24 PROCEDURE — G0145 SCR C/V CYTO,THINLAYER,RESCR: HCPCS | Performed by: OBSTETRICS & GYNECOLOGY

## 2020-06-24 RX ORDER — ESCITALOPRAM OXALATE 10 MG/1
10 TABLET ORAL DAILY
Qty: 30 TABLET | Refills: 4 | Status: SHIPPED | OUTPATIENT
Start: 2020-06-24 | End: 2021-01-21 | Stop reason: SDUPTHER

## 2020-06-24 RX ORDER — DOXYCYCLINE HYCLATE 20 MG
TABLET ORAL
COMMUNITY
Start: 2020-06-02 | End: 2022-01-08 | Stop reason: SDUPTHER

## 2020-06-29 LAB
LAB AP GYN PRIMARY INTERPRETATION: NORMAL
LAB AP LMP: NORMAL
Lab: NORMAL

## 2020-07-06 ENCOUNTER — TELEPHONE (OUTPATIENT)
Dept: OBGYN CLINIC | Facility: CLINIC | Age: 50
End: 2020-07-06

## 2020-07-06 NOTE — TELEPHONE ENCOUNTER
----- Message from Rosie Robbins DO sent at 7/6/2020  7:39 AM EDT -----  Inform pt fsh c/w menopause, had level done 6 months prior with same results  Offered to d/c OCP if menopause but explained menopause = 12 months of amenorrhea  See if she wants to stop OCP at this time  Please keep me updated

## 2021-01-21 DIAGNOSIS — F32.A DEPRESSION, UNSPECIFIED DEPRESSION TYPE: ICD-10-CM

## 2021-01-22 RX ORDER — ESCITALOPRAM OXALATE 10 MG/1
10 TABLET ORAL DAILY
Qty: 30 TABLET | Refills: 4 | Status: SHIPPED | OUTPATIENT
Start: 2021-01-22 | End: 2021-05-13

## 2021-01-22 NOTE — TELEPHONE ENCOUNTER
Pt states she is doing well on Lexapro - effective for menopausal sx, anxiety - requests rf  Please sign off on presc for OFELIA Bates  Yearly die 6/2021

## 2021-05-13 DIAGNOSIS — F32.A DEPRESSION, UNSPECIFIED DEPRESSION TYPE: ICD-10-CM

## 2021-05-13 RX ORDER — ESCITALOPRAM OXALATE 10 MG/1
TABLET ORAL
Qty: 30 TABLET | Refills: 0 | Status: SHIPPED | OUTPATIENT
Start: 2021-05-13 | End: 2021-10-21 | Stop reason: HOSPADM

## 2021-08-09 ENCOUNTER — TELEPHONE (OUTPATIENT)
Dept: OBGYN CLINIC | Facility: CLINIC | Age: 51
End: 2021-08-09

## 2021-08-09 DIAGNOSIS — N95.0 PMB (POSTMENOPAUSAL BLEEDING): Primary | ICD-10-CM

## 2021-08-09 NOTE — TELEPHONE ENCOUNTER
Patient calling with some light spotting over the course of four days    She states she has not had a period in one year

## 2021-08-09 NOTE — TELEPHONE ENCOUNTER
Had vag spotting mostly, pink, some brown from 7/31/2021 - 8/4/2021, 1 day cramping  Pt prev had [address] 11/2019 & 7/2020 both in menopausal range  Pt had d/c ocps  She had yearly last in 6/2020  Does not have yearly sched as of yet  Do you want her to get pelvic U/S first then EBX?

## 2021-08-10 NOTE — TELEPHONE ENCOUNTER
Pt informed to schedule appt for pelvic U/S (rad order in chart for [facility]) then recall office to schedule appt for EBX after U/S - pre-instr given

## 2021-08-16 ENCOUNTER — HOSPITAL ENCOUNTER (OUTPATIENT)
Dept: RADIOLOGY | Facility: HOSPITAL | Age: 51
Discharge: HOME | End: 2021-08-16
Attending: OBSTETRICS & GYNECOLOGY
Payer: COMMERCIAL

## 2021-08-16 DIAGNOSIS — N95.0 PMB (POSTMENOPAUSAL BLEEDING): ICD-10-CM

## 2021-08-16 PROCEDURE — 76856 US EXAM PELVIC COMPLETE: CPT

## 2021-08-16 PROCEDURE — 76830 TRANSVAGINAL US NON-OB: CPT

## 2021-08-17 NOTE — TELEPHONE ENCOUNTER
req results to be read pelvic U/S done 8/16/2021 [facility] reading room

## 2021-08-20 NOTE — TELEPHONE ENCOUNTER
Pt informed pelvic U/S results wnl - pt scheduled appt for EBX but wants to confirm with you if needs done

## 2021-08-30 NOTE — TELEPHONE ENCOUNTER
Called  [facility] rad reading room Stacey Soria) to confirm of report endometrial lining thickness if 4 mm (says "for" in report) - Pt also wants to confirm if she still needs EBX

## 2021-08-31 ENCOUNTER — TELEPHONE (OUTPATIENT)
Dept: OBGYN CLINIC | Facility: CLINIC | Age: 51
End: 2021-08-31

## 2021-08-31 NOTE — TELEPHONE ENCOUNTER
See ammended pelvic U/S results 8/16/2021 - endometrial lining thickness is 4 mm - pt wants to confirm if still needs EBX which she has scheduled for 11/3/2021

## 2021-09-01 NOTE — TELEPHONE ENCOUNTER
Rescheduled pt's appt for EBX to 10/21/2021 @ 3:30 pm & cance;;ed appt for 11/3/2021 (pt needed later appt with new job)

## 2021-09-01 NOTE — TELEPHONE ENCOUNTER
Lm pt's as re: Diony Patient recom for pt to have EBX - she has scheduled for 11/3/2021 but trying to schedule earlier appt for pt - OFELIA has cancellation 9/2/2021 @ 1:00 or 1:30

## 2021-10-21 ENCOUNTER — PROCEDURE VISIT (OUTPATIENT)
Dept: OBGYN CLINIC | Facility: CLINIC | Age: 51
End: 2021-10-21
Payer: COMMERCIAL

## 2021-10-21 VITALS
DIASTOLIC BLOOD PRESSURE: 80 MMHG | SYSTOLIC BLOOD PRESSURE: 130 MMHG | BODY MASS INDEX: 36.41 KG/M2 | HEIGHT: 67 IN | WEIGHT: 232 LBS

## 2021-10-21 DIAGNOSIS — Z12.39 ENCOUNTER FOR SCREENING FOR MALIGNANT NEOPLASM OF BREAST, UNSPECIFIED SCREENING MODALITY: ICD-10-CM

## 2021-10-21 DIAGNOSIS — N95.0 POSTMENOPAUSAL BLEEDING: Primary | ICD-10-CM

## 2021-10-21 PROCEDURE — 88175 CYTOPATH C/V AUTO FLUID REDO: CPT | Performed by: OBSTETRICS & GYNECOLOGY

## 2021-10-21 PROCEDURE — 99213 OFFICE O/P EST LOW 20 MIN: CPT | Performed by: OBSTETRICS & GYNECOLOGY

## 2021-10-21 PROCEDURE — 88305 TISSUE EXAM BY PATHOLOGIST: CPT | Performed by: PATHOLOGY

## 2021-10-21 PROCEDURE — 58100 BIOPSY OF UTERUS LINING: CPT | Performed by: OBSTETRICS & GYNECOLOGY

## 2021-10-21 RX ORDER — OMEPRAZOLE 40 MG/1
40 CAPSULE, DELAYED RELEASE ORAL DAILY
COMMUNITY
Start: 2021-08-24

## 2021-10-21 RX ORDER — TRIAMCINOLONE ACETONIDE 1 MG/G
CREAM TOPICAL 3 TIMES DAILY
COMMUNITY
Start: 2021-08-26 | End: 2022-08-26

## 2021-10-21 RX ORDER — FLUOCINONIDE CREAM (EMULSIFIED BASE) 0.5 MG/G
CREAM TOPICAL 2 TIMES DAILY
COMMUNITY

## 2021-10-27 LAB
LAB AP GYN PRIMARY INTERPRETATION: NORMAL
LAB AP LMP: NORMAL
Lab: NORMAL

## 2021-11-02 ENCOUNTER — TELEPHONE (OUTPATIENT)
Dept: OBGYN CLINIC | Facility: CLINIC | Age: 51
End: 2021-11-02

## 2021-11-30 ENCOUNTER — HOSPITAL ENCOUNTER (OUTPATIENT)
Dept: MAMMOGRAPHY | Facility: MEDICAL CENTER | Age: 51
Discharge: HOME | End: 2021-11-30
Payer: COMMERCIAL

## 2021-11-30 DIAGNOSIS — Z12.39 ENCOUNTER FOR SCREENING FOR MALIGNANT NEOPLASM OF BREAST, UNSPECIFIED SCREENING MODALITY: ICD-10-CM

## 2021-11-30 PROCEDURE — 77063 BREAST TOMOSYNTHESIS BI: CPT

## 2021-11-30 PROCEDURE — 77067 SCR MAMMO BI INCL CAD: CPT

## 2022-01-08 ENCOUNTER — AMB VIDEO VISIT (OUTPATIENT)
Dept: OTHER | Facility: HOSPITAL | Age: 52
End: 2022-01-08

## 2022-01-08 DIAGNOSIS — J01.20 ACUTE NON-RECURRENT ETHMOIDAL SINUSITIS: Primary | ICD-10-CM

## 2022-01-08 RX ORDER — DOXYCYCLINE HYCLATE 100 MG
100 TABLET ORAL 2 TIMES DAILY
Qty: 28 TABLET | Refills: 0 | Status: SHIPPED | OUTPATIENT
Start: 2022-01-08 | End: 2022-01-22

## 2022-01-08 NOTE — PATIENT INSTRUCTIONS
As discussed, sinus infections are typically viral and will get better on their own in 7-10 days  If your symptoms do not resolve in that time frame, you can take antibiotics because it is more likely to be bacterial at that point  Follow-up with your primary care physician for recheck in 2-3 business days  Go to the ER for sudden severe headache, high fevers, change in vision, seizure activity or anything else that is concerning

## 2022-01-08 NOTE — PROGRESS NOTES
Video Visit - Kip Bolanos 49 y o  female MRN: [id number]    REQUIRED DOCUMENTATION:         1  This service was provided via [employer]  2  Provider located at [address] [place] [id number]  3  [facility] provider: Hansa Leon PA-C   4  Identify all parties in room with patient during [facility] visit:  No one else  5  After connecting through [email or web address], patient was identified by name and date of birth  Patient was then informed that this was a Telemedicine visit and that the exam was being conducted confidentially over secure lines  My office door was closed  No one else was in the room  Patient acknowledged consent and understanding of privacy and security of the Telemedicine visit  I informed the patient that I have reviewed their record in Epic and presented the opportunity for them to ask any questions regarding the visit today  The patient agreed to participate  HPI  Pt runny stuffy nose  Decreased smell due to stuffiness  Pain in bridge of nose  Sx since Monday  Tested for COVID and was negative for COVID - Home test  Mucinex severe congestion with some temporary relief  Declines PCR test at this time  Physical Exam  Constitutional:       General: She is not in acute distress  Appearance: Normal appearance  She is obese  She is not ill-appearing or toxic-appearing  HENT:      Head: Normocephalic and atraumatic  Nose: No rhinorrhea  Mouth/Throat:      Mouth: Mucous membranes are moist    Eyes:      Conjunctiva/sclera: Conjunctivae normal    Pulmonary:      Effort: Pulmonary effort is normal  No respiratory distress  Breath sounds: No wheezing (no gross audible wheeze through computer)  Musculoskeletal:      Cervical back: Normal range of motion  Skin:     Findings: No rash (on face or neck)  Neurological:      Mental Status: She is alert  Cranial Nerves: No dysarthria or facial asymmetry     Psychiatric:         Mood and Affect: Mood normal          Behavior: Behavior normal          Diagnoses and all orders for this visit:    Acute non-recurrent ethmoidal sinusitis  -     doxycycline hyclate (VIBRA-TABS) 100 mg tablet; Take 1 tablet (100 mg total) by mouth 2 (two) times a day for 14 days      Patient Instructions   As discussed, sinus infections are typically viral and will get better on their own in 7-10 days  If your symptoms do not resolve in that time frame, you can take antibiotics because it is more likely to be bacterial at that point  Follow-up with your primary care physician for recheck in 2-3 business days  Go to the ER for sudden severe headache, high fevers, change in vision, seizure activity or anything else that is concerning

## 2022-03-25 ENCOUNTER — OFFICE VISIT (OUTPATIENT)
Dept: URGENT CARE | Facility: CLINIC | Age: 52
End: 2022-03-25
Payer: COMMERCIAL

## 2022-03-25 VITALS
DIASTOLIC BLOOD PRESSURE: 78 MMHG | HEIGHT: 66 IN | BODY MASS INDEX: 36.8 KG/M2 | TEMPERATURE: 98.1 F | WEIGHT: 229 LBS | HEART RATE: 68 BPM | RESPIRATION RATE: 20 BRPM | SYSTOLIC BLOOD PRESSURE: 108 MMHG

## 2022-03-25 DIAGNOSIS — R39.9 UTI SYMPTOMS: Primary | ICD-10-CM

## 2022-03-25 LAB
SL AMB  POCT GLUCOSE, UA: ABNORMAL
SL AMB LEUKOCYTE ESTERASE,UA: ABNORMAL
SL AMB POCT BILIRUBIN,UA: ABNORMAL
SL AMB POCT BLOOD,UA: ABNORMAL
SL AMB POCT CLARITY,UA: CLEAR
SL AMB POCT COLOR,UA: ABNORMAL
SL AMB POCT KETONES,UA: ABNORMAL
SL AMB POCT NITRITE,UA: ABNORMAL
SL AMB POCT PH,UA: 6
SL AMB POCT SPECIFIC GRAVITY,UA: 1
SL AMB POCT URINE PROTEIN: ABNORMAL
SL AMB POCT UROBILINOGEN: 0.2

## 2022-03-25 PROCEDURE — 87086 URINE CULTURE/COLONY COUNT: CPT | Performed by: PHYSICIAN ASSISTANT

## 2022-03-25 PROCEDURE — G0382 LEV 3 HOSP TYPE B ED VISIT: HCPCS | Performed by: PHYSICIAN ASSISTANT

## 2022-03-25 PROCEDURE — S9083 URGENT CARE CENTER GLOBAL: HCPCS | Performed by: PHYSICIAN ASSISTANT

## 2022-03-25 RX ORDER — NITROFURANTOIN 25; 75 MG/1; MG/1
100 CAPSULE ORAL 2 TIMES DAILY
Qty: 14 CAPSULE | Refills: 0 | Status: SHIPPED | OUTPATIENT
Start: 2022-03-25 | End: 2022-04-01

## 2022-03-25 RX ORDER — DOXYCYCLINE HYCLATE 20 MG
20 TABLET ORAL 2 TIMES DAILY
COMMUNITY
Start: 2022-02-18

## 2022-03-25 NOTE — LETTER
March 25, 2022     Patient: Radha Bolanos   YOB: 1970   Date of Visit: 3/25/2022       To Whom it May Concern:    Connor Benjy was seen in my clinic on 3/25/2022  If you have any questions or concerns, please don't hesitate to call           Sincerely,          Padmaja Jeffers PA-C        CC: No Recipients

## 2022-03-25 NOTE — PATIENT INSTRUCTIONS
Urinary Tract Infection in Women   WHAT YOU NEED TO KNOW:   A urinary tract infection (UTI) is caused by bacteria that get inside your urinary tract  Most bacteria that enter your urinary tract come out when you urinate  If the bacteria stay in your urinary tract, you may get an infection  Your urinary tract includes your kidneys, ureters, bladder, and urethra  Urine is made in your kidneys, and it flows from the ureters to the bladder  Urine leaves the bladder through the urethra  A UTI is more common in your lower urinary tract, which includes your bladder and urethra  DISCHARGE INSTRUCTIONS:   Return to the emergency department if:   · You are urinating very little or not at all  · You have a high fever with shaking chills  · You have side or back pain that gets worse  Call your doctor if:   · You have a fever  · You do not feel better after 2 days of taking antibiotics  · You are vomiting  · You have questions or concerns about your condition or care  Medicines:   · Antibiotics  help fight a bacterial infection  If you have UTIs often (called recurrent UTIs), you may be given antibiotics to take regularly  You will be given directions for when and how to use antibiotics  The goal is to prevent UTIs but not cause antibiotic resistance by using antibiotics too often  · Medicines  may be given to decrease pain and burning when you urinate  They will also help decrease the feeling that you need to urinate often  These medicines will make your urine orange or red  · Take your medicine as directed  Contact your healthcare provider if you think your medicine is not helping or if you have side effects  Tell him or her if you are allergic to any medicine  Keep a list of the medicines, vitamins, and herbs you take  Include the amounts, and when and why you take them  Bring the list or the pill bottles to follow-up visits   Carry your medicine list with you in case of an emergency  Follow up with your doctor as directed:  Write down your questions so you remember to ask them during your visits  Prevent another UTI:   · Empty your bladder often  Urinate and empty your bladder as soon as you feel the need  Do not hold your urine for long periods of time  · Wipe from front to back after you urinate or have a bowel movement  This will help prevent germs from getting into your urinary tract through your urethra  · Drink liquids as directed  Ask how much liquid to drink each day and which liquids are best for you  You may need to drink more liquids than usual to help flush out the bacteria  Do not drink alcohol, caffeine, or citrus juices  These can irritate your bladder and increase your symptoms  Your healthcare provider may recommend cranberry juice to help prevent a UTI  · Urinate after you have sex  This can help flush out bacteria passed during sex  · Do not douche or use feminine deodorants  These can change the chemical balance in your vagina  · Change sanitary pads or tampons often  This will help prevent germs from getting into your urinary tract  · Talk to your healthcare provider about your birth control method  You may need to change your method if it is increasing your risk for UTIs  · Wear cotton underwear and clothes that are loose  Tight pants and nylon underwear can trap moisture and cause bacteria to grow  · Vaginal estrogen may be recommended  This medicine helps prevent UTIs in women who have gone through menopause or are in monica-menopause  · Do pelvic muscle exercises often  Pelvic muscle exercises may help you start and stop urinating  Strong pelvic muscles may help you empty your bladder easier  Squeeze these muscles tightly for 5 seconds like you are trying to hold back urine  Then relax for 5 seconds  Gradually work up to squeezing for 10 seconds  Do 3 sets of 15 repetitions a day, or as directed      © Copyright [employer] 2022 Information is for [employer] use only and may not be sold, redistributed or otherwise used for commercial purposes  All illustrations and images included in CareNotes® are the copyrighted property of A D A M , Inc  or Ryley Alvarez  The above information is an  only  It is not intended as medical advice for individual conditions or treatments  Talk to your doctor, nurse or pharmacist before following any medical regimen to see if it is safe and effective for you

## 2022-03-26 LAB — BACTERIA UR CULT: NORMAL

## 2022-03-28 NOTE — PROGRESS NOTES
[facility]        NAME: Sami Valladares is a 47 y o  female  : 1970    MRN: [id number]  DATE: 2022  TIME: 11:08 AM    Assessment and Plan   UTI symptoms [R39 9]  1  UTI symptoms  POCT urine dip auto non-scope    Urine culture    nitrofurantoin (MACROBID) 100 mg capsule         Patient Instructions     Discussed symptoms and UA results with pt  I suspect an acute uncomplicated UTI  Will start pt on an oral abx and send out sample for culture to further evaluate  I rec increased hydration, rest, and observation  Follow up with PCP in 3-5 days  Proceed to  ER if symptoms worsen  Chief Complaint     Chief Complaint   Patient presents with    Possible UTI     Sx began at 0530 today: painful urination  History of Present Illness       Patient presents this morning with symptoms of burning during urination, frequency, urgency  Denies hematuria, flank pain, fever, chills, N/V, vaginal discharge  She has been hydrating  Review of Systems   Review of Systems   Constitutional: Negative  Respiratory: Negative  Cardiovascular: Negative  Gastrointestinal: Negative  Genitourinary: Positive for dysuria, frequency and urgency  Negative for flank pain, hematuria and vaginal discharge           Current Medications       Current Outpatient Medications:     DAILY MULTIPLE VITAMINS PO, Take 1 tablet by mouth daily, Disp: , Rfl:     doxycycline (PERIOSTAT) 20 MG tablet, Take 20 mg by mouth 2 (two) times a day, Disp: , Rfl:     fluocinonide (LIDEX) 0 05 % cream, APPLY TO AFFECTED AREAS ON ARMS AND LEGS TWICE A DAY, Disp: , Rfl:     Fluocinonide Emulsified Base 0 05 % CREA, Apply topically 2 (two) times a day, Disp: , Rfl:     mometasone (ELOCON) 0 1 % ointment, Apply topically daily, Disp: , Rfl:     omeprazole (PriLOSEC) 40 MG capsule, Take 40 mg by mouth daily, Disp: , Rfl:     triamcinolone (KENALOG) 0 1 % cream, Apply topically Three times a day, Disp: , Rfl:    nitrofurantoin (MACROBID) 100 mg capsule, Take 1 capsule (100 mg total) by mouth 2 (two) times a day for 7 days, Disp: 14 capsule, Rfl: 0    omeprazole (PriLOSEC) 20 mg delayed release capsule, Take 1 capsule by mouth daily (Patient not taking: Reported on 10/21/2021), Disp: , Rfl:     scopolamine (TRANSDERM-SCOP) 1 5 mg/3 days TD 72 hr patch, Place 1 patch on the skin every third day, Disp: , Rfl:   No current facility-administered medications for this visit      Facility-Administered Medications Ordered in Other Visits:     [facility]) injection 1 7 mcg, 0 02 mcg/kg (Order-Specific), Intravenous, Once, Andrés Flynn MD    Current Allergies     Allergies as of 03/25/2022 - Reviewed 03/25/2022   Allergen Reaction Noted    Cephalosporins Hives 08/02/2016    Penicillins Hives, Swelling, and Other (See Comments) 01/12/2016    Zithromax [azithromycin] Rash and GI Intolerance 01/12/2016    Erythromycin  01/05/2017    Ibuprofen  08/02/2016    Pseudoephedrine  08/02/2016    Quinolones  08/02/2016            The following portions of the patient's history were reviewed and updated as appropriate: allergies, current medications, past family history, past medical history, past social history, past surgical history and problem list      Past Medical History:   Diagnosis Date    Anxiety     Dyspepsia     Fatigue     GERD (gastroesophageal reflux disease)     Hyperglycemia        Past Surgical History:   Procedure Laterality Date    OTHER SURGICAL HISTORY      surgery excision of lesion of gum       Family History   Problem Relation Age of Onset    Thyroid disease Mother     No Known Problems Father     Alcohol abuse Maternal Grandmother     Arthritis Maternal Grandfather     Hypertension Maternal Grandfather     Diabetes Paternal Grandmother     Coronary artery disease Family     Diverticulitis Family     Diverticulosis Family     Irritable bowel syndrome Family     Prostate cancer Family     Pancreatic cancer Family     Pancreatitis Family     Mitral valve prolapse Family     Pernicious anemia Family     Gallbladder disease Family     Other Family         myocardial infarction         Medications have been verified  Objective   /78   Pulse 68   Temp 98 1 °F (36 7 °C)   Resp 20   Ht 5' 6" (1 676 m)   Wt 104 kg (229 lb)   BMI 36 96 kg/m²   No LMP recorded  Physical Exam     Physical Exam  Vitals reviewed  Constitutional:       General: She is not in acute distress  Appearance: She is well-developed  HENT:      Mouth/Throat:      Mouth: Mucous membranes are moist       Pharynx: Oropharynx is clear  Cardiovascular:      Rate and Rhythm: Normal rate and regular rhythm  Pulses: Normal pulses  Heart sounds: Normal heart sounds  No murmur heard  Pulmonary:      Effort: Pulmonary effort is normal  No respiratory distress  Breath sounds: Normal breath sounds  Abdominal:      Tenderness: There is no right CVA tenderness or left CVA tenderness  Neurological:      Mental Status: She is alert and oriented to person, place, and time

## 2022-11-29 ENCOUNTER — OFFICE VISIT (OUTPATIENT)
Dept: FAMILY MEDICINE CLINIC | Facility: CLINIC | Age: 52
End: 2022-11-29

## 2022-11-29 VITALS
WEIGHT: 188 LBS | HEART RATE: 83 BPM | SYSTOLIC BLOOD PRESSURE: 100 MMHG | OXYGEN SATURATION: 97 % | BODY MASS INDEX: 30.22 KG/M2 | TEMPERATURE: 98 F | RESPIRATION RATE: 16 BRPM | DIASTOLIC BLOOD PRESSURE: 70 MMHG | HEIGHT: 66 IN

## 2022-11-29 DIAGNOSIS — J30.9 ALLERGIC RHINITIS, UNSPECIFIED SEASONALITY, UNSPECIFIED TRIGGER: ICD-10-CM

## 2022-11-29 DIAGNOSIS — E04.1 NON-TOXIC UNINODULAR GOITER: Primary | ICD-10-CM

## 2022-11-29 DIAGNOSIS — Z13.220 NEED FOR LIPID SCREENING: ICD-10-CM

## 2022-11-29 DIAGNOSIS — K21.9 GASTROESOPHAGEAL REFLUX DISEASE WITHOUT ESOPHAGITIS: ICD-10-CM

## 2022-11-29 RX ORDER — PANTOPRAZOLE SODIUM 20 MG/1
20 TABLET, DELAYED RELEASE ORAL
Qty: 90 TABLET | Refills: 3 | Status: SHIPPED | OUTPATIENT
Start: 2022-11-29

## 2022-11-29 RX ORDER — SODIUM FLUORIDE 6 MG/ML
PASTE, DENTIFRICE DENTAL
COMMUNITY
Start: 2022-11-04

## 2022-11-29 RX ORDER — FLUTICASONE PROPIONATE 50 MCG
2 SPRAY, SUSPENSION (ML) NASAL DAILY
Qty: 48 G | Refills: 3 | Status: SHIPPED | OUTPATIENT
Start: 2022-11-29

## 2022-11-29 NOTE — PROGRESS NOTES
Chief Complaint   Patient presents with   • Establish Care   • Thyroid Problem   • Cold Like Symptoms     5 + days, stuffy nose and cough        HPI   27-year-old female presents as a new patient  Previously followed through [facility]  Past history significant for multinodular thyroid which is her concern today  She feels that her thyroid is getting bigger and presses on her throat and makes it a bit harder to swallow  Has not had thyroid blood test done for a long time  Last ultrasound was 5 years ago  Past history also significant for obesity but has lost 50 lb in the last 6 or 7 months  Has eczema and multiple steroid creams to use for it  History of some GERD symptoms but presently not on a PPI  Nonsmoker  No alcohol  Allergies include penicillin and cephalexin which caused hives  Ibuprofen caused some swelling  Zithromax causes GI intolerance  Past history is negative for high blood pressure, coronary disease, stroke, and cancer  Except for a basal cell which was removed from her chest   She has a history of anxiety and depression which were mostly related to being in a bad marriage with the alcoholic   Gets intermittent congestion in her nose before her present cold  Past Medical History:   Diagnosis Date   • Anxiety    • Dyspepsia    • Fatigue    • GERD (gastroesophageal reflux disease)    • Hyperglycemia         Past Surgical History:   Procedure Laterality Date   • OTHER SURGICAL HISTORY      surgery excision of lesion of gum       Social History     Tobacco Use   • Smoking status: Former   • Smokeless tobacco: Never   Substance Use Topics   • Alcohol use: No       Social History     Social History Narrative     since 2020 after 22 year marriage   was an alcoholic  Two children -26 and 19 as of 12/22  Youngest at home  Boyfriend, Destinee Higuera, lives with her since 2020  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      [employer] works for [employer]  Enjoys hiking and outdoor activities  Camping  The following portions of the patient's history were reviewed and updated as appropriate: allergies, current medications, past family history, past medical history, past social history, past surgical history and problem list       Review of Systems       /70   Pulse 83   Temp 98 °F (36 7 °C) (Temporal)   Resp 16   Ht 5' 6" (1 676 m)   Wt 85 3 kg (188 lb)   LMP  (LMP Unknown)   SpO2 97%   BMI 30 34 kg/m²      Physical Exam   Appears well  40 lb weight loss noted since March  Both eardrums are white  Throat reveals no erythema  Thyroid is questionably enlarged  Certainly not a huge goiter  Lungs are clear  Heart regular  Current Outpatient Medications:   •  DAILY MULTIPLE VITAMINS PO, Take 1 tablet by mouth daily, Disp: , Rfl:   •  doxycycline (PERIOSTAT) 20 MG tablet, Take 20 mg by mouth 2 (two) times a day, Disp: , Rfl:   •  fluocinonide (LIDEX) 0 05 % cream, APPLY TO AFFECTED AREAS ON ARMS AND LEGS TWICE A DAY, Disp: , Rfl:   •  Fluocinonide Emulsified Base 0 05 % CREA, Apply topically 2 (two) times a day, Disp: , Rfl:   •  fluticasone (FLONASE) 50 mcg/act nasal spray, 2 sprays into each nostril daily, Disp: 48 g, Rfl: 3  •  mometasone (ELOCON) 0 1 % ointment, Apply topically daily, Disp: , Rfl:   •  pantoprazole (PROTONIX) 20 mg tablet, Take 1 tablet (20 mg total) by mouth daily before breakfast, Disp: 90 tablet, Rfl: 3  •  Sodium Fluoride 5000 PPM 1 1 % PSTE, USE NIGHTLY BEFORE BED  BRUSH, SPIT OUT EXCESS, DO NOT RINSE OR EAT AFTER, Disp: , Rfl:   •  triamcinolone (KENALOG) 0 1 % cream, Apply topically Three times a day, Disp: , Rfl:   No current facility-administered medications for this visit  No problem-specific Assessment & Plan notes found for this encounter  Diagnoses and all orders for this visit:    Non-toxic uninodular goiter  -     US thyroid;  Future  -     TSH, 3rd generation with Free T4 reflex; Future    Gastroesophageal reflux disease without esophagitis  -     pantoprazole (PROTONIX) 20 mg tablet; Take 1 tablet (20 mg total) by mouth daily before breakfast    Need for lipid screening  -     Lipid panel; Future    Allergic rhinitis, unspecified seasonality, unspecified trigger  -     fluticasone (FLONASE) 50 mcg/act nasal spray; 2 sprays into each nostril daily    Other orders  -     Sodium Fluoride 5000 PPM 1 1 % PSTE; USE NIGHTLY BEFORE BED  BRUSH, SPIT OUT EXCESS, DO NOT RINSE OR EAT AFTER        Patient Instructions   History is reviewed  Will get an ultrasound of her thyroid although it is not very impressive to me  Also check thyroid function test   Screen lipid profile  Some of heard difficulty swallowing could be from acid reflux  Restart pantoprazole 20 mg daily best taken in the morning on an empty stomach  Trial of Flonase 2 sprays each nostril once a day for her chronic nose congestion and possible postnasal drip  Flu shot is recommended  Has not yet had colon cancer screening which will be addressed at subsequent visit  Recheck in 1 month

## 2022-11-29 NOTE — PATIENT INSTRUCTIONS
History is reviewed  Will get an ultrasound of her thyroid although it is not very impressive to me  Also check thyroid function test   Screen lipid profile  Some of heard difficulty swallowing could be from acid reflux  Restart pantoprazole 20 mg daily best taken in the morning on an empty stomach  Trial of Flonase 2 sprays each nostril once a day for her chronic nose congestion and possible postnasal drip  Flu shot is recommended  Has not yet had colon cancer screening which will be addressed at subsequent visit  Recheck in 1 month

## 2022-11-30 ENCOUNTER — LAB (OUTPATIENT)
Dept: LAB | Facility: CLINIC | Age: 52
End: 2022-11-30

## 2022-11-30 DIAGNOSIS — Z13.220 NEED FOR LIPID SCREENING: ICD-10-CM

## 2022-11-30 DIAGNOSIS — E04.1 NON-TOXIC UNINODULAR GOITER: ICD-10-CM

## 2022-11-30 LAB
CHOLEST SERPL-MCNC: 172 MG/DL
HDLC SERPL-MCNC: 39 MG/DL
LDLC SERPL CALC-MCNC: 110 MG/DL (ref 0–100)
NONHDLC SERPL-MCNC: 133 MG/DL
TRIGL SERPL-MCNC: 113 MG/DL
TSH SERPL DL<=0.05 MIU/L-ACNC: 1.27 UIU/ML (ref 0.45–4.5)

## 2022-12-09 ENCOUNTER — HOSPITAL ENCOUNTER (OUTPATIENT)
Dept: RADIOLOGY | Facility: HOSPITAL | Age: 52
Discharge: HOME | End: 2022-12-09
Attending: FAMILY MEDICINE

## 2022-12-09 DIAGNOSIS — E04.1 NON-TOXIC UNINODULAR GOITER: ICD-10-CM

## 2022-12-27 ENCOUNTER — OFFICE VISIT (OUTPATIENT)
Dept: FAMILY MEDICINE CLINIC | Facility: CLINIC | Age: 52
End: 2022-12-27

## 2022-12-27 VITALS
OXYGEN SATURATION: 98 % | DIASTOLIC BLOOD PRESSURE: 56 MMHG | SYSTOLIC BLOOD PRESSURE: 119 MMHG | RESPIRATION RATE: 18 BRPM | TEMPERATURE: 97.1 F | WEIGHT: 191 LBS | HEART RATE: 83 BPM | HEIGHT: 66 IN | BODY MASS INDEX: 30.7 KG/M2

## 2022-12-27 DIAGNOSIS — K59.00 CONSTIPATION, UNSPECIFIED CONSTIPATION TYPE: ICD-10-CM

## 2022-12-27 DIAGNOSIS — K21.9 GASTROESOPHAGEAL REFLUX DISEASE WITHOUT ESOPHAGITIS: Primary | ICD-10-CM

## 2022-12-27 DIAGNOSIS — R22.1 NECK MASS: ICD-10-CM

## 2022-12-27 DIAGNOSIS — Z12.11 SCREENING FOR COLON CANCER: ICD-10-CM

## 2022-12-27 DIAGNOSIS — J30.9 ALLERGIC RHINITIS, UNSPECIFIED SEASONALITY, UNSPECIFIED TRIGGER: ICD-10-CM

## 2022-12-27 RX ORDER — FAMOTIDINE 40 MG/1
40 TABLET, FILM COATED ORAL
Qty: 90 TABLET | Refills: 3 | Status: SHIPPED | OUTPATIENT
Start: 2022-12-27

## 2022-12-27 RX ORDER — MONTELUKAST SODIUM 10 MG/1
10 TABLET ORAL
Qty: 90 TABLET | Refills: 3 | Status: SHIPPED | OUTPATIENT
Start: 2022-12-27

## 2022-12-27 NOTE — PATIENT INSTRUCTIONS
Review of thyroid ultrasound and her normal thyroid function tests  For her indigestion, continue pantoprazole 20 mg 1 nightly stomach and can take famotidine 40 mg with supper  Referral for screening colonoscopy  Continues with symptoms of allergic rhinitis  Continue Flonase 2 sprays each nostril daily and add Allegra or other over-the-counter fexofenadine 180 mg once daily and Singulair 10 mg once daily  For dry skin, suggest regular use of moisturizing lotion, especially in the winter  For constipation, regular use of Metamucil with Dulcolax as needed  For hair loss, consider over-the-counter minoxidil  Observation of the lump in her neck  Follow-up in 3 months

## 2022-12-27 NOTE — PROGRESS NOTES
Chief Complaint   Patient presents with   • Follow-up     1 month   • [facility] [place] Colonoscopy     Due         HPI   Here for follow-up nontoxic goiter, GERD, and allergic rhinitis  She had an ultrasound of her thyroid unremarkable  No follow-up or biopsy recommended  Started on pantoprazole at last visit  Symptoms occur mainly at night  Started on Flonase at last visit  Crackling is better but her sinuses feel full  Thyroid studies were normal   She also complains of fatigue, hair loss, sore glands, dry or sore throat, always cold and shortness and constipation  Recently started Metamucil which is helped  Uses Dulcolax if she goes too long without a bowel  She notes a lump above her thyroid in the middle of her back  Past Medical History:   Diagnosis Date   • Allergic rhinitis 12/27/2022   • Anxiety    • Dyspepsia    • Fatigue    • GERD (gastroesophageal reflux disease)    • Hyperglycemia         Past Surgical History:   Procedure Laterality Date   • OTHER SURGICAL HISTORY      surgery excision of lesion of gum       Social History     Tobacco Use   • Smoking status: Former   • Smokeless tobacco: Never   Substance Use Topics   • Alcohol use: No       Social History     Social History Narrative     since 2020 after 22 year marriage   was an alcoholic  Two children -26 and 19 as of 12/22  Youngest at home  Boyfriend, Tsering Noonan, lives with her since 2020  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[employer]  Supplies the cement  [employer] works for [employer]  Enjoys hiking and outdoor activities  Camping          The following portions of the patient's history were reviewed and updated as appropriate: allergies, current medications, past family history, past medical history, past social history, past surgical history and problem list       Review of Systems       /56 (BP Location: Left arm, Patient Position: Sitting, Cuff Size: Large)   Pulse 83   Temp (!) 97 1 °F (36 2 °C) (Temporal)   Resp 18   Ht 5' 6" (1 676 m)   Wt 86 6 kg (191 lb)   LMP  (LMP Unknown)   SpO2 98%   BMI 30 83 kg/m²      Physical Exam   Nasal passages mildly narrowed on the left side but no septum deviation noted  Nodule present above the thyroid at the midline  Current Outpatient Medications:   •  DAILY MULTIPLE VITAMINS PO, Take 1 tablet by mouth daily, Disp: , Rfl:   •  doxycycline (PERIOSTAT) 20 MG tablet, Take 20 mg by mouth 2 (two) times a day, Disp: , Rfl:   •  famotidine (PEPCID) 40 MG tablet, Take 1 tablet (40 mg total) by mouth daily with dinner, Disp: 90 tablet, Rfl: 3  •  fluocinonide (LIDEX) 0 05 % cream, APPLY TO AFFECTED AREAS ON ARMS AND LEGS TWICE A DAY, Disp: , Rfl:   •  Fluocinonide Emulsified Base 0 05 % CREA, Apply topically 2 (two) times a day, Disp: , Rfl:   •  fluticasone (FLONASE) 50 mcg/act nasal spray, 2 sprays into each nostril daily, Disp: 48 g, Rfl: 3  •  mometasone (ELOCON) 0 1 % ointment, Apply topically daily, Disp: , Rfl:   •  montelukast (SINGULAIR) 10 mg tablet, Take 1 tablet (10 mg total) by mouth daily at bedtime, Disp: 90 tablet, Rfl: 3  •  pantoprazole (PROTONIX) 20 mg tablet, Take 1 tablet (20 mg total) by mouth daily before breakfast, Disp: 90 tablet, Rfl: 3  •  Sodium Fluoride 5000 PPM 1 1 % PSTE, USE NIGHTLY BEFORE BED  BRUSH, SPIT OUT EXCESS, DO NOT RINSE OR EAT AFTER, Disp: , Rfl:   •  triamcinolone (KENALOG) 0 1 % cream, Apply topically Three times a day, Disp: , Rfl:      No problem-specific Assessment & Plan notes found for this encounter  Diagnoses and all orders for this visit:    Gastroesophageal reflux disease without esophagitis  -     famotidine (PEPCID) 40 MG tablet; Take 1 tablet (40 mg total) by mouth daily with dinner    Allergic rhinitis, unspecified seasonality, unspecified trigger  -     montelukast (SINGULAIR) 10 mg tablet;  Take 1 tablet (10 mg total) by mouth daily at bedtime    Constipation, unspecified constipation type    Screening for colon cancer  -     Ambulatory referral for colonoscopy; Future    Neck mass        Patient Instructions   Review of thyroid ultrasound and her normal thyroid function tests  For her indigestion, continue pantoprazole 20 mg 1 nightly stomach and can take famotidine 40 mg with supper  Referral for screening colonoscopy  Continues with symptoms of allergic rhinitis  Continue Flonase 2 sprays each nostril daily and add Allegra or other over-the-counter fexofenadine 180 mg once daily and Singulair 10 mg once daily  For dry skin, suggest regular use of moisturizing lotion, especially in the winter  For constipation, regular use of Metamucil with Dulcolax as needed  For hair loss, consider over-the-counter minoxidil  Observation of the lump in her neck  Follow-up in 3 months

## 2023-04-04 ENCOUNTER — VBI (OUTPATIENT)
Dept: ADMINISTRATIVE | Facility: OTHER | Age: 53
End: 2023-04-04

## 2023-04-24 ENCOUNTER — HOSPITAL ENCOUNTER (OUTPATIENT)
Dept: RADIOLOGY | Age: 53
Discharge: HOME | End: 2023-04-24

## 2023-04-24 VITALS — BODY MASS INDEX: 31.02 KG/M2 | WEIGHT: 193 LBS | HEIGHT: 66 IN

## 2023-04-24 DIAGNOSIS — Z12.31 ENCOUNTER FOR SCREENING MAMMOGRAM FOR MALIGNANT NEOPLASM OF BREAST: ICD-10-CM

## 2023-04-24 DIAGNOSIS — Z12.39 BREAST CANCER SCREENING: ICD-10-CM

## 2023-04-24 PROBLEM — C44.91 BASAL CELL CARCINOMA: Status: ACTIVE | Noted: 2023-04-24

## 2023-06-13 ENCOUNTER — VBI (OUTPATIENT)
Dept: ADMINISTRATIVE | Facility: OTHER | Age: 53
End: 2023-06-13

## 2023-06-26 ENCOUNTER — OFFICE VISIT (OUTPATIENT)
Dept: OBGYN CLINIC | Facility: CLINIC | Age: 53
End: 2023-06-26
Payer: COMMERCIAL

## 2023-06-26 VITALS
BODY MASS INDEX: 32.24 KG/M2 | WEIGHT: 200.6 LBS | SYSTOLIC BLOOD PRESSURE: 98 MMHG | HEIGHT: 66 IN | DIASTOLIC BLOOD PRESSURE: 70 MMHG

## 2023-06-26 DIAGNOSIS — Z78.0 MENOPAUSE: Primary | ICD-10-CM

## 2023-06-26 DIAGNOSIS — N95.1 MENOPAUSAL VASOMOTOR SYNDROME: ICD-10-CM

## 2023-06-26 PROCEDURE — 99213 OFFICE O/P EST LOW 20 MIN: CPT | Performed by: OBSTETRICS & GYNECOLOGY

## 2023-06-26 RX ORDER — OMEPRAZOLE 20 MG/1
CAPSULE, DELAYED RELEASE ORAL
COMMUNITY
Start: 2023-06-17

## 2023-06-26 NOTE — PROGRESS NOTES
Assessment/Plan:  Reviewed menopausal symptoms/vasomotor symptoms  Reviewed conservative over-the-counter agents, SSRIs, HRT  Risks and benefits reviewed  At this point she would like to remain conservative  She will start over-the-counter agents and monitor for 2 to 3 weeks  She will return to office for annual visit or as needed  No problem-specific Assessment & Plan notes found for this encounter  Diagnoses and all orders for this visit:    Menopause    Menopausal vasomotor syndrome    Other orders  -     omeprazole (PriLOSEC) 20 mg delayed release capsule; TAKE 1 CAPSULE BY MOUTH TWICE A DAY TAKE 35 MINUTES BEFORE BREAKFAST AND DINNER          Subjective:      Patient ID: Rosibel Martin is a 47 y o  female  HPI     This is a pleasant 71-year-old female P2 ( x2) presents complaining of hot flashes, night sweats, cold sweats over the last 1 to 2 years  Her last menstrual cycle was 2020 followed by vaginal spotting 2021 (work-up included endometrial biopsy and pelvic ultrasound within normal limits)  She subsequently had spotting 2022  There is been no further bleeding or spotting  She also has noticed a change in weight gain  There is been no changes in bowel or bladder function  Patient is a non-smoker, no history of thrombosis  [address]      LMP 2020, VB 2021, 2022, 2022 EBX atrophy    The following portions of the patient's history were reviewed and updated as appropriate: allergies, current medications, past family history, past medical history, past social history, past surgical history and problem list     Review of Systems   Constitutional: Negative for fatigue, fever and unexpected weight change  Respiratory: Negative for cough, chest tightness, shortness of breath and wheezing  Cardiovascular: Negative  Negative for chest pain and palpitations  Gastrointestinal: Negative    Negative for abdominal distention, abdominal pain, blood in stool, "constipation, diarrhea, nausea and vomiting  Genitourinary: Negative  Negative for difficulty urinating, dyspareunia, dysuria, flank pain, frequency, genital sores, hematuria, pelvic pain, urgency, vaginal bleeding, vaginal discharge and vaginal pain  Skin: Negative for rash  Objective:      Ht 5' 6\" (1 676 m)   Wt 91 kg (200 lb 9 6 oz)   LMP 06/25/2020   BMI 32 38 kg/m²          Physical Exam        I have spent a total time of 20 minutes on 06/26/23 in caring for this patient including Risks and benefits of tx options, Instructions for management, Documenting in the medical record, Reviewing / ordering tests, medicine, procedures   and Obtaining or reviewing history          "

## 2024-04-09 ENCOUNTER — OFFICE VISIT (OUTPATIENT)
Dept: URGENT CARE | Facility: CLINIC | Age: 54
End: 2024-04-09
Payer: COMMERCIAL

## 2024-04-09 VITALS
TEMPERATURE: 99.2 F | OXYGEN SATURATION: 98 % | DIASTOLIC BLOOD PRESSURE: 90 MMHG | WEIGHT: 230 LBS | HEIGHT: 66 IN | RESPIRATION RATE: 18 BRPM | BODY MASS INDEX: 36.96 KG/M2 | HEART RATE: 94 BPM | SYSTOLIC BLOOD PRESSURE: 130 MMHG

## 2024-04-09 DIAGNOSIS — J06.9 ACUTE URI: Primary | ICD-10-CM

## 2024-04-09 DIAGNOSIS — R04.0 NOSEBLEED: ICD-10-CM

## 2024-04-09 PROCEDURE — 99213 OFFICE O/P EST LOW 20 MIN: CPT

## 2024-04-09 RX ORDER — FLUTICASONE PROPIONATE 50 MCG
1 SPRAY, SUSPENSION (ML) NASAL DAILY
Qty: 9.9 ML | Refills: 0 | Status: SHIPPED | OUTPATIENT
Start: 2024-04-09

## 2024-04-09 RX ORDER — PREDNISONE 20 MG/1
40 TABLET ORAL DAILY
COMMUNITY
Start: 2024-04-07

## 2024-04-09 RX ORDER — BENZONATATE 200 MG/1
200 CAPSULE ORAL 3 TIMES DAILY
COMMUNITY
Start: 2024-04-07

## 2024-04-09 RX ORDER — IPRATROPIUM BROMIDE 42 UG/1
SPRAY, METERED NASAL
COMMUNITY
Start: 2024-04-07

## 2024-04-09 NOTE — PROGRESS NOTES
[facility]        NAME: Yvonne Bolanos is a 54 y.o. female  : 1970    MRN: [id number]  DATE: 2024  TIME: 11:49 AM    Assessment and Plan   Acute URI [J06.9]  1. Acute URI  fluticasone (FLONASE) 50 mcg/act nasal spray      2. Nosebleed              Patient Instructions       Follow up with PCP in 3-5 days.  Proceed to  ER if symptoms worsen.    If tests have been performed at [facility], our office will contact you with results if changes need to be made to the care plan discussed with you at the visit.  You can review your full results on [facility].    Chief Complaint     Chief Complaint   Patient presents with    Sinusitis     Patient states that she was seen online and was given benzonatate 200mg, prednisone 20, and ipratropium bromide. She is here because this morning she blew her nose and it was bloody         History of Present Illness       55 y/o F presents for nosebleed times this morning.  Patient was having cold symptoms for 4 days with sinus complaints, cough, headache, hoarse voice, feeling her heart beating.  Pt seen via telehealth through [facility] 3 days ago.  No formal diagnosis.  Was given 40 mg of prednisone for 4 days, Tessalon Perles to use as needed, ipratropium nasal spray to use 4 times a day.  Patient has been using as directed.  Patient notes this morning when she went to blow her nose she had a nosebleed.  Denies any current nosebleed.    Sinusitis  Associated symptoms include congestion and coughing. Pertinent negatives include no chills, ear pain or sore throat.       Review of Systems   Review of Systems   Constitutional:  Positive for fever. Negative for chills.   HENT:  Positive for congestion and rhinorrhea. Negative for ear pain, sinus pain and sore throat.    Respiratory:  Positive for cough.          Current Medications       Current Outpatient Medications:     benzonatate (TESSALON) 200 MG capsule, Take 200 mg by mouth 3 (three) times a day, Disp:  , Rfl:     DAILY MULTIPLE VITAMINS PO, Take 1 tablet by mouth daily, Disp: , Rfl:     famotidine (PEPCID) 40 MG tablet, Take 1 tablet (40 mg total) by mouth daily with dinner, Disp: 90 tablet, Rfl: 3    Fluocinonide Emulsified Base 0.05 % CREA, Apply topically 2 (two) times a day, Disp: , Rfl:     fluticasone (FLONASE) 50 mcg/act nasal spray, 2 sprays into each nostril daily, Disp: 48 g, Rfl: 3    fluticasone (FLONASE) 50 mcg/act nasal spray, 1 spray into each nostril daily, Disp: 9.9 mL, Rfl: 0    ipratropium (ATROVENT) 0.06 % nasal spray, USE 2 SPRAYS NASALLY 4 TIMES A DAY, Disp: , Rfl:     mometasone (ELOCON) 0.1 % ointment, Apply topically daily, Disp: , Rfl:     montelukast (SINGULAIR) 10 mg tablet, Take 1 tablet (10 mg total) by mouth daily at bedtime, Disp: 90 tablet, Rfl: 3    predniSONE 20 mg tablet, Take 40 mg by mouth daily, Disp: , Rfl:     Sodium Fluoride 5000 PPM 1.1 % PSTE, USE NIGHTLY BEFORE BED. BRUSH, SPIT OUT EXCESS, DO NOT RINSE OR EAT AFTER, Disp: , Rfl:     doxycycline (PERIOSTAT) 20 MG tablet, Take 20 mg by mouth 2 (two) times a day, Disp: , Rfl:     fluocinonide (LIDEX) 0.05 % cream, APPLY TO AFFECTED AREAS ON ARMS AND LEGS TWICE A DAY, Disp: , Rfl:     omeprazole (PriLOSEC) 20 mg delayed release capsule, TAKE 1 CAPSULE BY MOUTH TWICE A DAY TAKE 35 MINUTES BEFORE BREAKFAST AND DINNER, Disp: , Rfl:     pantoprazole (PROTONIX) 20 mg tablet, Take 1 tablet (20 mg total) by mouth daily before breakfast (Patient not taking: Reported on 6/26/2023), Disp: 90 tablet, Rfl: 3    triamcinolone (KENALOG) 0.1 % cream, Apply topically Three times a day, Disp: , Rfl:     Current Allergies     Allergies as of 04/09/2024 - Reviewed 04/09/2024   Allergen Reaction Noted    Cephalosporins Hives 08/02/2016    Penicillins Hives, Swelling, and Other (See Comments) 01/12/2016    Zithromax [azithromycin] Rash and GI Intolerance 01/12/2016    Erythromycin  01/05/2017    Ibuprofen  08/02/2016            The following  "portions of the patient's history were reviewed and updated as appropriate: allergies, current medications, past family history, past medical history, past social history, past surgical history and problem list.     Past Medical History:   Diagnosis Date    Allergic rhinitis 12/27/2022    Anxiety     Basal cell carcinoma     Dyspepsia     Fatigue     GERD (gastroesophageal reflux disease)     Hyperglycemia        Past Surgical History:   Procedure Laterality Date    OTHER SURGICAL HISTORY      surgery excision of lesion of gum       Family History   Problem Relation Age of Onset    Thyroid disease Mother     No Known Problems Father     Alcohol abuse Maternal Grandmother     Arthritis Maternal Grandfather     Hypertension Maternal Grandfather     Diabetes Paternal Grandmother     Coronary artery disease Family     Diverticulitis Family     Diverticulosis Family     Irritable bowel syndrome Family     Prostate cancer Family     Pancreatic cancer Family     Pancreatitis Family     Mitral valve prolapse Family     Pernicious anemia Family     Gallbladder disease Family     Other Family         myocardial infarction         Medications have been verified.        Objective   /90   Pulse 94   Temp 99.2 °F (37.3 °C) (Tympanic)   Resp 18   Ht 5' 6\" (1.676 m)   Wt 104 kg (230 lb)   SpO2 98%   BMI 37.12 kg/m²   No LMP recorded. Patient is perimenopausal.       Physical Exam     Physical Exam  Vitals and nursing note reviewed.   Constitutional:       General: She is not in acute distress.     Appearance: She is not toxic-appearing.   HENT:      Head: Normocephalic and atraumatic.      Right Ear: Tympanic membrane, ear canal and external ear normal.      Left Ear: Tympanic membrane, ear canal and external ear normal.      Nose: Nose normal.      Comments: No dried blood in the naris     Mouth/Throat:      Mouth: Mucous membranes are moist.      Pharynx: No oropharyngeal exudate or posterior oropharyngeal erythema. "   Eyes:      Conjunctiva/sclera: Conjunctivae normal.   Cardiovascular:      Rate and Rhythm: Normal rate and regular rhythm.   Pulmonary:      Effort: Pulmonary effort is normal.      Breath sounds: Normal breath sounds.   Lymphadenopathy:      Cervical: No cervical adenopathy.   Neurological:      Mental Status: She is alert.   Psychiatric:         Mood and Affect: Mood normal.         Behavior: Behavior normal.

## 2024-04-09 NOTE — LETTER
April 9, 2024     Patient: Yvonne Bolanos   YOB: 1970   Date of Visit: 4/9/2024       To Whom it May Concern:    Yvonne Bolanos was seen in my clinic on 4/9/2024. She may return to work on 4/10/24 .    If you have any questions or concerns, please don't hesitate to call.         Sincerely,          Ancelmo Pepper PA-C        CC: No Recipients

## 2024-04-25 ENCOUNTER — OFFICE VISIT (OUTPATIENT)
Dept: FAMILY MEDICINE CLINIC | Facility: CLINIC | Age: 54
End: 2024-04-25
Payer: COMMERCIAL

## 2024-04-25 DIAGNOSIS — Z13.29 SCREENING FOR THYROID DISORDER: ICD-10-CM

## 2024-04-25 DIAGNOSIS — Z12.31 ENCOUNTER FOR SCREENING MAMMOGRAM FOR MALIGNANT NEOPLASM OF BREAST: ICD-10-CM

## 2024-04-25 DIAGNOSIS — Z13.89 SCREENING FOR BLOOD OR PROTEIN IN URINE: ICD-10-CM

## 2024-04-25 DIAGNOSIS — F32.A DEPRESSION, UNSPECIFIED DEPRESSION TYPE: ICD-10-CM

## 2024-04-25 DIAGNOSIS — R49.0 HOARSENESS OF VOICE: ICD-10-CM

## 2024-04-25 DIAGNOSIS — Z76.89 ENCOUNTER TO ESTABLISH CARE: Primary | ICD-10-CM

## 2024-04-25 DIAGNOSIS — Z13.228 SCREENING FOR METABOLIC DISORDER: ICD-10-CM

## 2024-04-25 DIAGNOSIS — R63.8 UNABLE TO LOSE WEIGHT: ICD-10-CM

## 2024-04-25 DIAGNOSIS — E55.9 VITAMIN D DEFICIENCY: ICD-10-CM

## 2024-04-25 DIAGNOSIS — Z13.6 SCREENING FOR CARDIOVASCULAR CONDITION: ICD-10-CM

## 2024-04-25 DIAGNOSIS — Z13.220 SCREENING FOR LIPID DISORDERS: ICD-10-CM

## 2024-04-25 PROCEDURE — 99204 OFFICE O/P NEW MOD 45 MIN: CPT | Performed by: NURSE PRACTITIONER

## 2024-04-25 RX ORDER — BUPROPION HYDROCHLORIDE 150 MG/1
150 TABLET ORAL DAILY
Qty: 90 TABLET | Refills: 1 | Status: SHIPPED | OUTPATIENT
Start: 2024-04-25

## 2024-04-26 VITALS
SYSTOLIC BLOOD PRESSURE: 120 MMHG | BODY MASS INDEX: 33.73 KG/M2 | HEART RATE: 88 BPM | DIASTOLIC BLOOD PRESSURE: 74 MMHG | TEMPERATURE: 97.8 F | HEIGHT: 66 IN | RESPIRATION RATE: 15 BRPM | OXYGEN SATURATION: 98 % | WEIGHT: 209.9 LBS

## 2024-04-26 NOTE — PROGRESS NOTES
Assessment/Plan:     Diagnoses and all orders for this visit:    Encounter to establish care    [facility]     Hoarseness of voice  -     Ambulatory Referral to Otolaryngology; Future    Refer to ENT if symptoms persist as patient has already been using Flonase and antihistamine without much benefit. She denies dysphagia.    Depression, unspecified depression type  -     buPROPion (Wellbutrin XL) 150 mg 24 hr tablet; Take 1 tablet (150 mg total) by mouth daily    Plan to start Wellbutrin daily. Medication and s/e reviewed. Pt to RTO in 6 weeks for recheck or sooner PRN.    Unable to lose weight    Check labs for further evaluation. Possible benefit of weight loss from Wellbutrin which will also be hopefully helping patient's depression.     Screening for cardiovascular condition  -     CBC and differential; Future    Screening for metabolic disorder  -     Comprehensive metabolic panel; Future    Screening for lipid disorders  -     Lipid panel; Future    Screening for thyroid disorder  -     TSH, 3rd generation with Free T4 reflex; Future    Screening for blood or protein in urine  -     UA (URINE) with reflex to Scope; Future    Vitamin D deficiency  -     Vitamin D 25 hydroxy; Future    Encounter for screening mammogram for malignant neoplasm of breast  -     Mammo screening bilateral w 3d & cad; Future          Pt to RTO in 6 weeks for depression recheck/annual physical.  Labs due prior to next OV.    Subjective:      Patient ID: Yvonne Bolanos is a 54 y.o. female.    New patient here today to establish care.  Pt has hx of GERD, goiter, basal cell CA, anxiety/depression.  Pt has concerns about hoarse voice ongoing for 2+ weeks.  She has tried OTC medications without relief.  Pt also has concerns over worsening depression.  She admits the her family noticed that she is not her same self.  Pt feels like it is difficult to get motivated.  Pt denies self harm/SI.  Pt would  "like to try medication.  She does have concerns about difficulty losing weight.  She would like to labs drawn.  Pt is hoping to start anti-depression medication that does not cause weight gain.      Depression Screening and Follow-up Plan: Patient's depression screening was positive with a PHQ-2 score of 5. Their PHQ-9 score was 18. Patient assessed for underlying major depression. Brief counseling provided and recommend additional follow-up/re-evaluation next office visit.         The following portions of the patient's history were reviewed and updated as appropriate: allergies, current medications, past family history, past medical history, past social history, past surgical history, and problem list.    Review of Systems    As noted per HPI.    Objective:      /74   Pulse 88   Temp 97.8 °F (36.6 °C)   Resp 15   Ht 5' 5.75\" (1.67 m)   Wt 95.2 kg (209 lb 14.4 oz)   LMP 06/25/2020   SpO2 98%   BMI 34.14 kg/m²          Physical Exam  Vitals reviewed.   Constitutional:       Appearance: Normal appearance.   HENT:      Right Ear: Tympanic membrane, ear canal and external ear normal.      Left Ear: Tympanic membrane, ear canal and external ear normal.      Mouth/Throat:      Pharynx: No oropharyngeal exudate or posterior oropharyngeal erythema.   Neck:      Thyroid: No thyroid mass, thyromegaly or thyroid tenderness.   Cardiovascular:      Rate and Rhythm: Normal rate and regular rhythm.      Pulses: Normal pulses.      Heart sounds: Normal heart sounds.   Pulmonary:      Effort: Pulmonary effort is normal.      Breath sounds: Normal breath sounds.   Lymphadenopathy:      Cervical: No cervical adenopathy.   Neurological:      Mental Status: She is alert and oriented to person, place, and time. Mental status is at baseline.   Psychiatric:         Mood and Affect: Mood normal.         Behavior: Behavior normal.         Thought Content: Thought content normal.         Judgment: Judgment normal.           "

## 2024-04-29 ENCOUNTER — APPOINTMENT (OUTPATIENT)
Dept: LAB | Facility: CLINIC | Age: 54
End: 2024-04-29
Payer: COMMERCIAL

## 2024-04-29 DIAGNOSIS — Z13.29 SCREENING FOR THYROID DISORDER: ICD-10-CM

## 2024-04-29 DIAGNOSIS — Z13.6 SCREENING FOR CARDIOVASCULAR CONDITION: ICD-10-CM

## 2024-04-29 DIAGNOSIS — E55.9 VITAMIN D DEFICIENCY: ICD-10-CM

## 2024-04-29 DIAGNOSIS — Z13.228 SCREENING FOR METABOLIC DISORDER: ICD-10-CM

## 2024-04-29 DIAGNOSIS — Z13.89 SCREENING FOR BLOOD OR PROTEIN IN URINE: ICD-10-CM

## 2024-04-29 DIAGNOSIS — Z13.220 SCREENING FOR LIPID DISORDERS: ICD-10-CM

## 2024-04-29 LAB
25(OH)D3 SERPL-MCNC: 34.3 NG/ML (ref 30–100)
ALBUMIN SERPL BCP-MCNC: 4.4 G/DL (ref 3.5–5)
ALP SERPL-CCNC: 86 U/L (ref 34–104)
ALT SERPL W P-5'-P-CCNC: 24 U/L (ref 7–52)
ANION GAP SERPL CALCULATED.3IONS-SCNC: 10 MMOL/L (ref 4–13)
AST SERPL W P-5'-P-CCNC: 19 U/L (ref 13–39)
BACTERIA UR QL AUTO: ABNORMAL /HPF
BASOPHILS # BLD AUTO: 0.02 THOUSANDS/ÂΜL (ref 0–0.1)
BASOPHILS NFR BLD AUTO: 0 % (ref 0–1)
BILIRUB SERPL-MCNC: 0.39 MG/DL (ref 0.2–1)
BILIRUB UR QL STRIP: NEGATIVE
BUN SERPL-MCNC: 17 MG/DL (ref 5–25)
CALCIUM SERPL-MCNC: 9.4 MG/DL (ref 8.4–10.2)
CHLORIDE SERPL-SCNC: 105 MMOL/L (ref 96–108)
CHOLEST SERPL-MCNC: 252 MG/DL
CLARITY UR: CLEAR
CO2 SERPL-SCNC: 27 MMOL/L (ref 21–32)
COLOR UR: ABNORMAL
CREAT SERPL-MCNC: 0.71 MG/DL (ref 0.6–1.3)
EOSINOPHIL # BLD AUTO: 0.31 THOUSAND/ÂΜL (ref 0–0.61)
EOSINOPHIL NFR BLD AUTO: 4 % (ref 0–6)
ERYTHROCYTE [DISTWIDTH] IN BLOOD BY AUTOMATED COUNT: 12.3 % (ref 11.6–15.1)
GFR SERPL CREATININE-BSD FRML MDRD: 97 ML/MIN/1.73SQ M
GLUCOSE P FAST SERPL-MCNC: 114 MG/DL (ref 65–99)
GLUCOSE UR STRIP-MCNC: NEGATIVE MG/DL
HCT VFR BLD AUTO: 41.8 % (ref 34.8–46.1)
HDLC SERPL-MCNC: 59 MG/DL
HGB BLD-MCNC: 13.8 G/DL (ref 11.5–15.4)
HGB UR QL STRIP.AUTO: NEGATIVE
IMM GRANULOCYTES # BLD AUTO: 0.02 THOUSAND/UL (ref 0–0.2)
IMM GRANULOCYTES NFR BLD AUTO: 0 % (ref 0–2)
KETONES UR STRIP-MCNC: NEGATIVE MG/DL
LDLC SERPL CALC-MCNC: 161 MG/DL (ref 0–100)
LEUKOCYTE ESTERASE UR QL STRIP: ABNORMAL
LYMPHOCYTES # BLD AUTO: 2.34 THOUSANDS/ÂΜL (ref 0.6–4.47)
LYMPHOCYTES NFR BLD AUTO: 31 % (ref 14–44)
MCH RBC QN AUTO: 28.9 PG (ref 26.8–34.3)
MCHC RBC AUTO-ENTMCNC: 33 G/DL (ref 31.4–37.4)
MCV RBC AUTO: 87 FL (ref 82–98)
MONOCYTES # BLD AUTO: 0.53 THOUSAND/ÂΜL (ref 0.17–1.22)
MONOCYTES NFR BLD AUTO: 7 % (ref 4–12)
NEUTROPHILS # BLD AUTO: 4.26 THOUSANDS/ÂΜL (ref 1.85–7.62)
NEUTS SEG NFR BLD AUTO: 58 % (ref 43–75)
NITRITE UR QL STRIP: NEGATIVE
NON-SQ EPI CELLS URNS QL MICRO: ABNORMAL /HPF
NONHDLC SERPL-MCNC: 193 MG/DL
NRBC BLD AUTO-RTO: 0 /100 WBCS
PH UR STRIP.AUTO: 6 [PH]
PLATELET # BLD AUTO: 280 THOUSANDS/UL (ref 149–390)
PMV BLD AUTO: 9.9 FL (ref 8.9–12.7)
POTASSIUM SERPL-SCNC: 4.6 MMOL/L (ref 3.5–5.3)
PROT SERPL-MCNC: 7.2 G/DL (ref 6.4–8.4)
PROT UR STRIP-MCNC: NEGATIVE MG/DL
RBC # BLD AUTO: 4.78 MILLION/UL (ref 3.81–5.12)
RBC #/AREA URNS AUTO: ABNORMAL /HPF
SODIUM SERPL-SCNC: 142 MMOL/L (ref 135–147)
SP GR UR STRIP.AUTO: 1.01 (ref 1–1.03)
TRIGL SERPL-MCNC: 159 MG/DL
TSH SERPL DL<=0.05 MIU/L-ACNC: 2.33 UIU/ML (ref 0.45–4.5)
UROBILINOGEN UR STRIP-ACNC: <2 MG/DL
WBC # BLD AUTO: 7.48 THOUSAND/UL (ref 4.31–10.16)
WBC #/AREA URNS AUTO: ABNORMAL /HPF

## 2024-04-29 PROCEDURE — 81001 URINALYSIS AUTO W/SCOPE: CPT

## 2024-04-29 PROCEDURE — 84443 ASSAY THYROID STIM HORMONE: CPT

## 2024-04-29 PROCEDURE — 82306 VITAMIN D 25 HYDROXY: CPT

## 2024-04-29 PROCEDURE — 80053 COMPREHEN METABOLIC PANEL: CPT

## 2024-04-29 PROCEDURE — 80061 LIPID PANEL: CPT

## 2024-04-29 PROCEDURE — 85025 COMPLETE CBC W/AUTO DIFF WBC: CPT

## 2024-04-29 PROCEDURE — 36415 COLL VENOUS BLD VENIPUNCTURE: CPT

## 2024-04-30 DIAGNOSIS — E78.2 MIXED HYPERLIPIDEMIA: Primary | ICD-10-CM

## 2024-04-30 DIAGNOSIS — R73.09 ELEVATED GLUCOSE: ICD-10-CM

## 2024-05-09 ENCOUNTER — HOSPITAL ENCOUNTER (OUTPATIENT)
Dept: RADIOLOGY | Age: 54
Discharge: HOME | End: 2024-05-09
Payer: COMMERCIAL

## 2024-05-09 DIAGNOSIS — Z12.31 ENCOUNTER FOR SCREENING MAMMOGRAM FOR MALIGNANT NEOPLASM OF BREAST: ICD-10-CM

## 2024-05-09 PROCEDURE — 77067 SCR MAMMO BI INCL CAD: CPT

## 2024-05-09 PROCEDURE — 77063 BREAST TOMOSYNTHESIS BI: CPT

## 2024-05-13 ENCOUNTER — VBI (OUTPATIENT)
Dept: ADMINISTRATIVE | Facility: OTHER | Age: 54
End: 2024-05-13

## 2024-06-13 ENCOUNTER — RA CDI HCC (OUTPATIENT)
Dept: OTHER | Facility: HOSPITAL | Age: 54
End: 2024-06-13

## 2024-06-13 NOTE — PROGRESS NOTES
HCC coding opportunities          Chart Reviewed number of suggestions sent to Provider: 1   Depression: found on active problem list, Can Depression be further classified as per ICD 10 coding guidelines.  Patients Insurance        Commercial Insurance: [employer]

## 2024-06-20 ENCOUNTER — OFFICE VISIT (OUTPATIENT)
Dept: FAMILY MEDICINE CLINIC | Facility: CLINIC | Age: 54
End: 2024-06-20
Payer: COMMERCIAL

## 2024-06-20 VITALS
TEMPERATURE: 97.4 F | RESPIRATION RATE: 16 BRPM | SYSTOLIC BLOOD PRESSURE: 128 MMHG | DIASTOLIC BLOOD PRESSURE: 82 MMHG | OXYGEN SATURATION: 96 % | HEIGHT: 66 IN | HEART RATE: 80 BPM | BODY MASS INDEX: 33.72 KG/M2 | WEIGHT: 209.8 LBS

## 2024-06-20 DIAGNOSIS — Z00.00 ANNUAL PHYSICAL EXAM: Primary | ICD-10-CM

## 2024-06-20 DIAGNOSIS — F41.9 ANXIETY: ICD-10-CM

## 2024-06-20 PROCEDURE — 99396 PREV VISIT EST AGE 40-64: CPT | Performed by: NURSE PRACTITIONER

## 2024-06-20 NOTE — PROGRESS NOTES
Adult Annual Physical  Name: Yvonne Bolanos      : 1970      MRN: [id number]  Encounter Provider: ALFA Yuen  Encounter Date: 2024   Encounter department: [facility]    Pt declining Shingrix.  Fasting labs UTD. Repeat lipids and A1c late July/August.  Mammo UTD.  PAP through GYN.  Pt deferring colon cancer screening.  Pt to RTO in 1 year for annual physical or sooner PRN.    Assessment & Plan   1. Annual physical exam    2. Anxiety    Pt doing well w/ Wellbutrin. Continue current dosing. If patient every needs dosing adjustment in the future, she is encouraged to reach out via [employer].        Immunizations and preventive care screenings were discussed with patient today. Appropriate education was printed on patient's after visit summary.    Counseling:  Alcohol/drug use: discussed moderation in alcohol intake, the recommendations for healthy alcohol use, and avoidance of illicit drug use.  Dental Health: discussed importance of regular tooth brushing, flossing, and dental visits.  Injury prevention: discussed safety/seat belts, safety helmets, smoke detectors, carbon dioxide detectors, and smoking near bedding or upholstery.  Sexual health: discussed sexually transmitted diseases, partner selection, use of condoms, avoidance of unintended pregnancy, and contraceptive alternatives.  Exercise: the importance of regular exercise/physical activity was discussed. Recommend exercise 3-5 times per week for at least 30 minutes.       Depression Screening and Follow-up Plan: Patient was screened for depression during today's encounter. They screened negative with a PHQ-2 score of 2.        History of Present Illness     Adult Annual Physical:  Patient presents for annual physical.     Diet and Physical Activity:  - Diet/Nutrition: well balanced diet, consuming 3-5 servings of fruits/vegetables daily and adequate fiber intake.  - Exercise: no  "formal exercise, more than 2 hours on average and 30-60 minutes on average.    Depression Screening:  - PHQ-2 Score: 2    General Health:  - Sleep: sleeps well and 7-8 hours of sleep on average.  - Hearing: normal hearing bilateral ears.  - Vision: goes for regular eye exams, wears glasses and no vision problems.  - Dental: regular dental visits and brushes teeth twice daily.    /GYN Health:  - Follows with GYN: yes.     Review of Systems   Constitutional: Negative.  Negative for chills and fatigue.   HENT: Negative.     Respiratory: Negative.  Negative for cough and shortness of breath.    Cardiovascular: Negative.  Negative for chest pain.   Gastrointestinal: Negative.    Genitourinary: Negative.    Musculoskeletal: Negative.  Negative for myalgias.   Neurological: Negative.          Objective     /82   Pulse 80   Temp (!) 97.4 °F (36.3 °C)   Resp 16   Ht 5' 5.75\" (1.67 m)   Wt 95.2 kg (209 lb 12.8 oz)   LMP 06/25/2020   SpO2 96%   BMI 34.12 kg/m²     Physical Exam  Vitals and nursing note reviewed.   Constitutional:       General: She is not in acute distress.     Appearance: Normal appearance. She is well-developed. She is not ill-appearing.   HENT:      Head: Normocephalic and atraumatic.      Right Ear: Tympanic membrane, ear canal and external ear normal.      Left Ear: Tympanic membrane, ear canal and external ear normal.   Eyes:      Conjunctiva/sclera: Conjunctivae normal.   Neck:      Vascular: No carotid bruit.   Cardiovascular:      Rate and Rhythm: Normal rate and regular rhythm.      Pulses: Normal pulses.      Heart sounds: Normal heart sounds. No murmur heard.  Pulmonary:      Effort: Pulmonary effort is normal. No respiratory distress.      Breath sounds: Normal breath sounds. No wheezing.   Abdominal:      General: There is no distension.      Palpations: Abdomen is soft. There is no mass.      Tenderness: There is no abdominal tenderness. There is no guarding or rebound.      " Hernia: No hernia is present.   Musculoskeletal:         General: Normal range of motion.      Cervical back: Normal range of motion and neck supple.      Right lower leg: No edema.      Left lower leg: No edema.   Skin:     General: Skin is warm and dry.      Capillary Refill: Capillary refill takes less than 2 seconds.   Neurological:      Mental Status: She is alert and oriented to person, place, and time. Mental status is at baseline.      Motor: No weakness.      Gait: Gait normal.   Psychiatric:         Mood and Affect: Mood normal.         Behavior: Behavior normal.         Thought Content: Thought content normal.         Judgment: Judgment normal.

## 2024-12-18 ENCOUNTER — VBI (OUTPATIENT)
Dept: ADMINISTRATIVE | Facility: OTHER | Age: 54
End: 2024-12-18

## 2024-12-19 NOTE — TELEPHONE ENCOUNTER
12/18/24 7:15 PM     Chart reviewed for   Cervical Cancer Screening    ; nothing is submitted to the patient's insurance at this time.     ELZA PANG MA   [facility]

## 2025-02-11 DIAGNOSIS — F40.243 FEAR OF FLYING: Primary | ICD-10-CM

## 2025-02-11 RX ORDER — ALPRAZOLAM 0.25 MG/1
TABLET ORAL
Qty: 2 TABLET | Refills: 0 | Status: SHIPPED | OUTPATIENT
Start: 2025-02-11

## 2025-05-19 DIAGNOSIS — F32.A DEPRESSION, UNSPECIFIED DEPRESSION TYPE: ICD-10-CM

## 2025-05-19 RX ORDER — BUPROPION HYDROCHLORIDE 150 MG/1
150 TABLET ORAL DAILY
Qty: 90 TABLET | Refills: 1 | Status: SHIPPED | OUTPATIENT
Start: 2025-05-19

## 2025-05-19 NOTE — TELEPHONE ENCOUNTER
Medication: buPROPion (Wellbutrin XL) 150 mg 24 hr tablet     Dose/Frequency: Take 1 tablet (150 mg total) by mouth daily     Quantity: 90    Pharmacy: [id number] - [place] - [id number] [facility] [address].     Office:   [x] PCP/Provider -  ALFA Yuen   [] Speciality/Provider -     Does the patient have enough for 3 days?   [x] Yes   [] No - Send as HP to POD

## 2025-07-01 ENCOUNTER — OFFICE VISIT (OUTPATIENT)
Dept: FAMILY MEDICINE CLINIC | Facility: CLINIC | Age: 55
End: 2025-07-01
Payer: COMMERCIAL

## 2025-07-01 VITALS
SYSTOLIC BLOOD PRESSURE: 150 MMHG | RESPIRATION RATE: 18 BRPM | HEIGHT: 66 IN | OXYGEN SATURATION: 97 % | WEIGHT: 208.2 LBS | BODY MASS INDEX: 33.46 KG/M2 | TEMPERATURE: 98.4 F | HEART RATE: 69 BPM | DIASTOLIC BLOOD PRESSURE: 90 MMHG

## 2025-07-01 DIAGNOSIS — R33.9 INCOMPLETE BLADDER EMPTYING: ICD-10-CM

## 2025-07-01 DIAGNOSIS — Z12.31 ENCOUNTER FOR SCREENING MAMMOGRAM FOR BREAST CANCER: ICD-10-CM

## 2025-07-01 DIAGNOSIS — Z12.89 ENCOUNTER FOR PELVIC SCREENING FOR CANCER: ICD-10-CM

## 2025-07-01 DIAGNOSIS — Z00.00 ANNUAL PHYSICAL EXAM: ICD-10-CM

## 2025-07-01 DIAGNOSIS — R39.9 URINARY SYMPTOM OR SIGN: Primary | ICD-10-CM

## 2025-07-01 DIAGNOSIS — N32.89 BLADDER SPASM: ICD-10-CM

## 2025-07-01 DIAGNOSIS — Z12.11 ENCOUNTER FOR SCREENING COLONOSCOPY: ICD-10-CM

## 2025-07-01 LAB
SL AMB  POCT GLUCOSE, UA: NORMAL
SL AMB LEUKOCYTE ESTERASE,UA: NORMAL
SL AMB POCT BILIRUBIN,UA: NORMAL
SL AMB POCT BLOOD,UA: NORMAL
SL AMB POCT CLARITY,UA: CLEAR
SL AMB POCT COLOR,UA: YELLOW
SL AMB POCT KETONES,UA: NORMAL
SL AMB POCT NITRITE,UA: NORMAL
SL AMB POCT PH,UA: 5
SL AMB POCT SPECIFIC GRAVITY,UA: 1
SL AMB POCT URINE PROTEIN: NORMAL
SL AMB POCT UROBILINOGEN: 0.2

## 2025-07-01 PROCEDURE — 99396 PREV VISIT EST AGE 40-64: CPT | Performed by: FAMILY MEDICINE

## 2025-07-01 PROCEDURE — 81002 URINALYSIS NONAUTO W/O SCOPE: CPT | Performed by: FAMILY MEDICINE

## 2025-07-01 PROCEDURE — 99214 OFFICE O/P EST MOD 30 MIN: CPT | Performed by: FAMILY MEDICINE

## 2025-07-01 NOTE — PROGRESS NOTES
Adult Annual Physical  Name: Yvonne Bolanos      : 1970      MRN: [id number]  Encounter Provider: Araceli Kendall DO  Encounter Date: 2025   Encounter department: [facility]    :  Please call the following specialist and set up appointments  Urogynecologist for the bladder spasms and the difficulty peeing  GYN for your Pap  GI for your colonoscopy    Please get your mammogram done  Think about shingles vaccines and pneumonia vaccines somewhere in the future like next year    Recheck as needed or in 1 year  Assessment & Plan  Urinary symptom or sign  I will ask patient to see Dr. Yusuf regarding her bladder and to do some testing to see exactly what is going on  Orders:    POCT urine dip    Ambulatory Referral to Urogynecology; Future    Encounter for screening mammogram for breast cancer    Orders:    Mammo screening bilateral w 3d and cad    Encounter for pelvic screening for cancer    Orders:    Ambulatory referral to Obstetrics / Gynecology; Future    Encounter for screening colonoscopy    Orders:    Ambulatory referral to Gastroenterology; Future    Incomplete bladder emptying    Orders:    Ambulatory Referral to Urogynecology; Future    Bladder spasm    Orders:    Ambulatory Referral to Urogynecology; Future    Annual physical exam             Preventive Screenings:  - Diabetes Screening: screening up-to-date  - Cholesterol Screening: screening up-to-date   - Hepatitis C screening: screening up-to-date   - HIV screening: screening not indicated   - Cervical cancer screening: orders placed   - Breast cancer screening: screening up-to-date and orders placed   - Colon cancer screening: orders placed   - Lung cancer screening: screening not indicated     No smoking for the last 18 years     Immunizations:  - Immunizations due: Prevnar 20 and Zoster (Shingrix)  - The patient declines recommended vaccines currently despite my recommendations   "    Counseling/Anticipatory Guidance:    - Dental health: discussed importance of regular tooth brushing, flossing, and dental visits.   - Diet: discussed recommendations for a healthy/well-balanced diet.   - Exercise: the importance of regular exercise/physical activity was discussed. Recommend exercise 3-5 times per week for at least 30 minutes.       Depression Screening and Follow-up Plan: Patient was screened for depression during today's encounter. They screened negative with a PHQ-2 score of 1.          History of Present Illness   Patient is here initially because of vibration like sensations in her bladder that come and go and are not very strong.  It feels like the phone is somewhat stuck in her bladder.  She had this a few years ago but then it went away  She also noticed that she is having a hard time totally emptying her bladder    She also has some nasal congestion    Also under stress from splitting with her boyfriend and distributing assets      Adult Annual Physical:  Patient presents for annual physical.     Diet and Physical Activity:  - Diet/Nutrition: well balanced diet.  - Exercise: moderate cardiovascular exercise and 3-4 times a week on average.    Depression Screening:  - PHQ-2 Score: 1    General Health:    - Vision: most recent eye exam < 1 year ago.  - Dental: regular dental visits.    /GYN Health:  - Follows with GYN: no.     Review of Systems  See HPI      Objective   /90   Pulse 69   Temp 98.4 °F (36.9 °C)   Resp 18   Ht 5' 5.75\" (1.67 m)   Wt 94.4 kg (208 lb 3.2 oz)   LMP 06/25/2020   SpO2 97%   BMI 33.86 kg/m²     Physical Exam    Gen.  No acute distress well-appearing well-nourished appears stated age    Mental status  Good judgment and insight oriented to time person and place, recent and remote memory intact mood and affect normal cooperative and patient is reasonable    HEENT  PERRLA 3 mm, EOMI without nystagmus, normocephalic atraumatic without facial " weakness    Neck   supple no masses trachea midline positive click normal carotid upstrokes with no bruits    Cor  Regular rhythm without ectopy or murmur, no S3-S4, normal palpation that is no heave lift or thrill    Vascular  No edema, good pedal pulses    Lungs  CTA bilaterally in no respiratory distress no wheezes rhonchi or rales, normal to palpation no tactile fremitus    Abdomen  Soft, no palpable masses, no hepatosplenomegaly, normal bowel sounds, nontender    Lymphatics  No palpable nodes in the neck, supraclavicular area, axilla, or groin    Musculoskeletal  No clubbing cyanosis or edema muscle tone normal    Skin  no rashes or abnormal appearing lesions    Neuro  Normal ambulation, cranial nerves 2-12 grossly intact, higher functioning with reasoning intact.

## 2025-07-01 NOTE — PATIENT INSTRUCTIONS
:  Please call the following specialist and set up appointments  Urogynecologist for the bladder spasms and the difficulty peeing  GYN for your Pap  GI for your colonoscopy    Please get your mammogram done  Think about shingles vaccines and pneumonia vaccines somewhere in the future like next year    Recheck as needed or in 1 year

## 2025-07-16 ENCOUNTER — ANNUAL EXAM (OUTPATIENT)
Dept: OBGYN CLINIC | Facility: CLINIC | Age: 55
End: 2025-07-16
Payer: COMMERCIAL

## 2025-07-16 VITALS
DIASTOLIC BLOOD PRESSURE: 80 MMHG | BODY MASS INDEX: 33.11 KG/M2 | SYSTOLIC BLOOD PRESSURE: 130 MMHG | HEIGHT: 66 IN | WEIGHT: 206 LBS

## 2025-07-16 DIAGNOSIS — Z12.31 ENCOUNTER FOR SCREENING MAMMOGRAM FOR MALIGNANT NEOPLASM OF BREAST: ICD-10-CM

## 2025-07-16 DIAGNOSIS — Z01.419 ENCOUNTER FOR ANNUAL ROUTINE GYNECOLOGICAL EXAMINATION: Primary | ICD-10-CM

## 2025-07-16 DIAGNOSIS — Z12.89 ENCOUNTER FOR PELVIC SCREENING FOR CANCER: ICD-10-CM

## 2025-07-16 DIAGNOSIS — Z12.11 COLON CANCER SCREENING: ICD-10-CM

## 2025-07-16 PROCEDURE — S0612 ANNUAL GYNECOLOGICAL EXAMINA: HCPCS | Performed by: OBSTETRICS & GYNECOLOGY

## 2025-07-16 PROCEDURE — G0145 SCR C/V CYTO,THINLAYER,RESCR: HCPCS | Performed by: OBSTETRICS & GYNECOLOGY

## 2025-07-16 NOTE — PROGRESS NOTES
Name: Yvonne Bolanos      : 1970      MRN: [id number]  Encounter Provider: Tiff Nina DO  Encounter Date: 2025   Encounter department: [facility]  :  Assessment & Plan  Encounter for annual routine gynecological examination    Orders:    Liquid-based pap, screening    Encounter for screening mammogram for malignant neoplasm of breast    Orders:    Mammo screening bilateral w 3d and cad; Future    Colon cancer screening    Orders:    Cologuard    Encounter for pelvic screening for cancer    Orders:    Ambulatory referral to Obstetrics / Gynecology    Pap smear done as well as annual.  Encouraged self breast examination as well as calcium supplementation.  Continue annual mammogram.  Reviewed colon cancer screening, offered Cologuard.  She will continue to follow-up with primary care as scheduled.  Return to office in 1 year or as needed      History of Present Illness   HPI  Yvonne Bolanos is a 55 y.o. female who presents     This is a pleasant 55-year-old female P2 ( x 2, age 32, 22) presents for her GYN exam.  She went through menopause at age 50.  She has never been on hormone replacement therapy.  She denies any vaginal bleeding or spotting.  No changes in bowel or bladder function.  She does continue to get night sweats, improved.  She has been in a monogamous relationship for 5 years but has recently broken up.  She follows up with primary care on a regular basis.    +hot flashes, more nite sweat    Sex + x 6 yrs, breaking      Mammo 2024    Colon    Pap      History obtained from: patient    Review of Systems   Constitutional:  Negative for fatigue, fever and unexpected weight change.   Respiratory:  Negative for cough, chest tightness, shortness of breath and wheezing.    Cardiovascular: Negative.  Negative for chest pain and palpitations.   Gastrointestinal: Negative.  Negative for abdominal distention, abdominal pain, blood in stool, constipation, diarrhea, nausea and  "vomiting.   Genitourinary: Negative.  Negative for difficulty urinating, dyspareunia, dysuria, flank pain, frequency, genital sores, hematuria, pelvic pain, urgency, vaginal bleeding, vaginal discharge and vaginal pain.   Skin:  Negative for rash.     Medications Ordered Prior to Encounter[1]      Objective   /80   Ht 5' 6\" (1.676 m)   Wt 93.4 kg (206 lb)   LMP 06/25/2020   BMI 33.25 kg/m²      Physical Exam  Constitutional:       Appearance: Normal appearance. She is well-developed.   HENT:      Head: Normocephalic and atraumatic.     Cardiovascular:      Rate and Rhythm: Normal rate and regular rhythm.   Pulmonary:      Effort: Pulmonary effort is normal.      Breath sounds: Normal breath sounds.   Chest:   Breasts:     Right: No inverted nipple, mass, nipple discharge, skin change or tenderness.      Left: No inverted nipple, mass, nipple discharge, skin change or tenderness.   Abdominal:      General: Bowel sounds are normal. There is no distension.      Palpations: Abdomen is soft.      Tenderness: There is no abdominal tenderness. There is no guarding or rebound.   Genitourinary:     Labia:         Right: No rash, tenderness or lesion.         Left: No rash, tenderness or lesion.       Vagina: Normal. No signs of injury. No vaginal discharge or tenderness.      Cervix: No cervical motion tenderness, discharge, friability, lesion or cervical bleeding.      Uterus: Not enlarged and not tender.       Adnexa:         Right: No mass, tenderness or fullness.          Left: No mass, tenderness or fullness.       Neurological:      Mental Status: She is alert.     Psychiatric:         Behavior: Behavior normal.         Administrative Statements   I have spent a total time of 25 minutes in caring for this patient on the day of the visit/encounter including Impressions, Counseling / Coordination of care, Documenting in the medical record, Reviewing/placing orders in the medical record (including tests, " medications, and/or procedures), and Obtaining or reviewing history  .       [1]   Current Outpatient Medications on File Prior to Visit   Medication Sig Dispense Refill    buPROPion (Wellbutrin XL) 150 mg 24 hr tablet Take 1 tablet (150 mg total) by mouth daily 90 tablet 1    DAILY MULTIPLE VITAMINS PO Take 1 tablet by mouth in the morning.      Fluocinonide Emulsified Base 0.05 % CREA Apply topically in the morning and in the evening.      ipratropium (ATROVENT) 0.06 % nasal spray       mometasone (ELOCON) 0.1 % ointment Apply topically if needed      Probiotic Product (PROBIOTIC DAILY PO) Take by mouth      Sodium Fluoride 5000 PPM 1.1 % PSTE       ALPRAZolam (XANAX) 0.25 mg tablet Take 1-2 tablets by mouth 30 minutes prior to flying. (Patient not taking: Reported on 7/16/2025) 2 tablet 0    famotidine (PEPCID) 40 MG tablet Take 1 tablet (40 mg total) by mouth daily with dinner (Patient taking differently: Take 40 mg by mouth if needed) 90 tablet 3     No current facility-administered medications on file prior to visit.

## 2025-07-21 LAB
LAB AP GYN PRIMARY INTERPRETATION: NORMAL
Lab: NORMAL

## 2025-08-01 ENCOUNTER — OFFICE VISIT (OUTPATIENT)
Dept: FAMILY MEDICINE CLINIC | Facility: CLINIC | Age: 55
End: 2025-08-01
Payer: COMMERCIAL

## 2025-08-01 VITALS
WEIGHT: 202.2 LBS | SYSTOLIC BLOOD PRESSURE: 120 MMHG | BODY MASS INDEX: 32.5 KG/M2 | HEART RATE: 78 BPM | OXYGEN SATURATION: 97 % | HEIGHT: 66 IN | DIASTOLIC BLOOD PRESSURE: 76 MMHG | TEMPERATURE: 97.8 F | RESPIRATION RATE: 15 BRPM

## 2025-08-01 DIAGNOSIS — E78.2 MIXED HYPERLIPIDEMIA: ICD-10-CM

## 2025-08-01 DIAGNOSIS — F32.A DEPRESSION, UNSPECIFIED DEPRESSION TYPE: ICD-10-CM

## 2025-08-01 DIAGNOSIS — K21.9 GASTROESOPHAGEAL REFLUX DISEASE WITHOUT ESOPHAGITIS: Primary | ICD-10-CM

## 2025-08-01 DIAGNOSIS — F41.9 ANXIETY: ICD-10-CM

## 2025-08-01 PROCEDURE — 99214 OFFICE O/P EST MOD 30 MIN: CPT | Performed by: NURSE PRACTITIONER

## 2025-08-01 RX ORDER — PANTOPRAZOLE SODIUM 20 MG/1
20 TABLET, DELAYED RELEASE ORAL
Qty: 30 TABLET | Refills: 5 | Status: SHIPPED | OUTPATIENT
Start: 2025-08-01 | End: 2026-01-28

## 2025-08-01 RX ORDER — ALPRAZOLAM 0.25 MG
0.25 TABLET ORAL 2 TIMES DAILY PRN
Qty: 15 TABLET | Refills: 0 | Status: SHIPPED | OUTPATIENT
Start: 2025-08-01

## 2025-08-01 RX ORDER — BUPROPION HYDROCHLORIDE 300 MG/1
300 TABLET ORAL DAILY
Qty: 90 TABLET | Refills: 2 | Status: SHIPPED | OUTPATIENT
Start: 2025-08-01

## 2025-08-01 RX ORDER — ESTRADIOL 0.1 MG/G
CREAM VAGINAL
COMMUNITY
Start: 2025-07-28

## 2025-08-07 LAB — COLOGUARD RESULT REPORTABLE: NEGATIVE
